# Patient Record
Sex: FEMALE | Race: WHITE | NOT HISPANIC OR LATINO | Employment: OTHER | URBAN - METROPOLITAN AREA
[De-identification: names, ages, dates, MRNs, and addresses within clinical notes are randomized per-mention and may not be internally consistent; named-entity substitution may affect disease eponyms.]

---

## 2019-08-28 ENCOUNTER — ANESTHESIA EVENT (OUTPATIENT)
Dept: SURGERY | Facility: AMBULARY SURGERY CENTER | Age: 74
End: 2019-08-28
Payer: MEDICARE

## 2019-08-28 RX ORDER — PRAVASTATIN SODIUM 20 MG/1
20 TABLET ORAL DAILY
COMMUNITY
End: 2024-01-18

## 2019-08-28 RX ORDER — METFORMIN HYDROCHLORIDE 500 MG/1
500 TABLET ORAL 2 TIMES DAILY WITH MEALS
COMMUNITY
End: 2024-01-18

## 2019-08-28 RX ORDER — LISINOPRIL AND HYDROCHLOROTHIAZIDE 10; 12.5 MG/1; MG/1
1 TABLET ORAL DAILY
COMMUNITY
End: 2024-03-25

## 2019-08-28 NOTE — DISCHARGE INSTRUCTIONS
After Cataract Surgery    You may remove your shield on the day of surgery. You may see double and your vision may be blurry when the patch is first removed. If you are uncomfortable with these symptoms, you may leave  the shield on    Tape the eye sheild over your eye at bedtime and naptime for 7 days. Tape is provided in your kit.    Do not vigorously press or rub your eye after surgery for 6 weeks.    Clean around your eyelids regularly being careful not to press on the upper lid.    You may shower, bathe and shampoo but avoid getting water, soap or shampoo in your eye for 2 weeks.  No swimming for 2 weeks.    Resume normal activities tomorrow, but no lifting or bending for 1 week.  Rest today. Reading, writing or watching TV are fine.     No eye makeup for 1 week, no eye creams for 24 hrs. Caution using perfumes or colognes.    You may be sensitive to light and  may wear sunglasses provided when outdoors     Your glasses may not be the right strength for your eye after surgery.  You may wear your old glasses or go without.  You may find [employer] reading glasses helpful.    Symptoms you may experience the first day:  Blurry vision, Double Vision, Redish color to objects, Sensitivity to light, flickering light, Burning or scratchy feeling in eye.    Serious Problems that could arise that need immediate reporting to the Dr:     A sudden Decrease or loss of vision in either eye  Your vision is becoming worse instead of better each day  You develop a shadow in your vision  You see flashing lights  You have a continuous ache or increased pain not relieved by over the counter pain medication.    If you have any questions, please re read this carefully. If your  Question is still not answered call the office. [phone]. We are here to help you    Using eye drops:  Wash hands, shake drops well, tilt head back, open eye and look up, gently pull lower eyelid down to form a pocket, place drop into the pocket formed,  close eye    Be careful not to touch your eyelashes or finger to the tip of the bottle. Allow 5 m between each drop.    Prednisolone 1 drop 4x daily  Polymyxin 1 drop 4x daily  Ketorolac 1 drop 4 x daily    Abida ph?u thu?t ??c th?y leslie th?    B?n có th? lo?i b? lá ch?n c?a b?n vào ngày ph?u thu?t. B?n có th? th?y g?p ?ôi và t?m nhìn c?a b?n có th? b? m? khi mi?ng vá ???c g? b? l?n ??u tiên. N?u b?n không tho?i mái v?i nh?ng tri?u ch?ng này, b?n có th? ?? t?m khiên davidsonn    Dán m?t lên m?t khi ?i ng? và ng? tr?a itz 7 ngày. B?ng ???c cung c?p itz b? c?a b?n.    Không ?n m?nh ho?c d?i m?t abida ph?u thu?t itz 6 tu?n.    Th??ng xuyên v? sinh xung quanh mí m?t c?a b?n c?n th?n không ?n vào n?p trên.    B?n có th? t?m, t?m và g?i ??u nh?ng tránh ?? n??c, xà phòng ho?c d?u g?i vào m?t itz 2 tu?n. Không b?i itz 2 tu?n.    Ti?p t?c các ho?t ??ng bình th??ng vào ngày anirudh, nh?ng không nâng ho?c u?n itz 1 tu?n. Ngh? ng?i hôm nay. ??c, vi?t ho?c xem TV ??u ?n.    Không marifer ?i?m m?t itz 1 tu?n, không dùng arabella m?t izt 24 gi?. Th?n tr?ng khi s? d?ng n??c darya ho?c colognes.    B?n có th? nh?y c?m v?i ánh sáng và có th? ?eo kính râm khi ? ngoài tr?i    Kính c?a b?n có th? không ph?i là s?c m?nh phù h?p v?i m?t c?a b?n abida khi ph?u thu?t. B?n có th? ?eo kính c? c?a b?n ho?c ?i mà không có. B?n có th? th?y c?a hàng durg ??c kính h?u ích.    Các tri?u ch?ng b?n có th? g?p itz ngày ??u tiên:  T?m nhìn m?, T?m nhìn ?ôi, Màu ?? cho các v?t th?, Nh?y c?m v?i ánh sáng, ánh sáng nh?p nháy, C?m giác nóng rát ho?c khó ch?u itz m?t.    Các v?n ?? nghiêm tr?ng có th? phát sinh c?n báo cáo ngay cho Ti?n s?:    Gi?m ho?c m?t th? l?c ??t ng?t ? m?t itz ed m?t  T?m nhìn c?a b?n ?ang tr? nên t?i t? thay vì t?t h?n m?i ngày  B?n phát tri?n m?t cái bóng itz t?m nhìn c?a b?n  B?n th?y ?èn nh?p nháy  B?n b? ?au liên t?c ho?c ?au t?ng lên không gi?m b?ng thu?c gi?m ?au không kê ??n.    N?u b?n có b?t k? câu h?i, dylan toddi lòng ??c  l?i ?i?u này m?t cách c?n th?n. N?u câu h?i c?a b?n v?n không ???c tr? l?i hãy g?i cho v?n phòng. . Chúng tôi ? ?ây ?? giúp b?n    S? d?ng thu?c nh? m?t:  R?a rossi, l?c nh?, nghiêng ??u, m? m?t và nhìn lên, nh? nhàng kéo mí m?t d??i govea?ng ?? t?o thành túi, ??t vào túi hình thành, nh?m m?t l?i  C?n th?n không ch?m vào lông mi ho?c ngón rossi c?a b?n vào ??u yulia. Cho phép 5 m gi?a m?i l?n th?.    Prednison 1 gi?t 4x m?i yvonne  Polymyxin 1 gi?t 4x m?i yvonne  Ketorolac 1 gi?t 4 x m?lópez russell     Send feedback  History  [facility]

## 2019-08-29 ENCOUNTER — HOSPITAL ENCOUNTER (OUTPATIENT)
Facility: AMBULARY SURGERY CENTER | Age: 74
Discharge: HOME | End: 2019-08-29
Attending: OPHTHALMOLOGY | Admitting: OPHTHALMOLOGY
Payer: MEDICARE

## 2019-08-29 ENCOUNTER — ANESTHESIA (OUTPATIENT)
Dept: SURGERY | Facility: AMBULARY SURGERY CENTER | Age: 74
End: 2019-08-29
Payer: MEDICARE

## 2019-08-29 DIAGNOSIS — H26.9 CATARACT, LEFT: ICD-10-CM

## 2019-08-29 PROCEDURE — D9220A PRA ANESTHESIA: ICD-10-PCS | Mod: ,,, | Performed by: ANESTHESIOLOGY

## 2019-08-29 PROCEDURE — 66984 XCAPSL CTRC RMVL W/O ECP: CPT | Performed by: OPHTHALMOLOGY

## 2019-08-29 PROCEDURE — D9220A PRA ANESTHESIA: Mod: ,,, | Performed by: ANESTHESIOLOGY

## 2019-08-29 DEVICE — LENS 23.0: Type: IMPLANTABLE DEVICE | Site: EYE | Status: FUNCTIONAL

## 2019-08-29 RX ORDER — CYCLOPENTOLATE HYDROCHLORIDE 10 MG/ML
1 SOLUTION/ DROPS OPHTHALMIC
Status: COMPLETED | OUTPATIENT
Start: 2019-08-29 | End: 2019-08-29

## 2019-08-29 RX ORDER — TROPICAMIDE 10 MG/ML
1 SOLUTION/ DROPS OPHTHALMIC
Status: COMPLETED | OUTPATIENT
Start: 2019-08-29 | End: 2019-08-29

## 2019-08-29 RX ORDER — POLYMYXIN B SULFATE AND TRIMETHOPRIM 1; 10000 MG/ML; [USP'U]/ML
SOLUTION OPHTHALMIC
Status: DISCONTINUED | OUTPATIENT
Start: 2019-08-29 | End: 2019-08-29 | Stop reason: HOSPADM

## 2019-08-29 RX ORDER — LIDOCAINE HYDROCHLORIDE 10 MG/ML
1 INJECTION, SOLUTION EPIDURAL; INFILTRATION; INTRACAUDAL; PERINEURAL ONCE
Status: COMPLETED | OUTPATIENT
Start: 2019-08-29 | End: 2019-08-29

## 2019-08-29 RX ORDER — ACETAMINOPHEN 325 MG/1
650 TABLET ORAL ONCE
Status: COMPLETED | OUTPATIENT
Start: 2019-08-29 | End: 2019-08-29

## 2019-08-29 RX ORDER — TROPICAMIDE 10 MG/ML
1 SOLUTION/ DROPS OPHTHALMIC
Status: DISCONTINUED | OUTPATIENT
Start: 2019-08-29 | End: 2023-04-24

## 2019-08-29 RX ORDER — CYCLOPENTOLATE HYDROCHLORIDE 10 MG/ML
1 SOLUTION/ DROPS OPHTHALMIC
Status: DISCONTINUED | OUTPATIENT
Start: 2019-08-29 | End: 2023-04-24

## 2019-08-29 RX ORDER — EPINEPHRINE CONVENIENCE KIT 1 MG/ML(1)
KIT INTRAMUSCULAR; SUBCUTANEOUS
Status: DISCONTINUED | OUTPATIENT
Start: 2019-08-29 | End: 2019-08-29 | Stop reason: HOSPADM

## 2019-08-29 RX ORDER — LIDOCAINE HYDROCHLORIDE 40 MG/ML
1 INJECTION, SOLUTION RETROBULBAR
Status: COMPLETED | OUTPATIENT
Start: 2019-08-29 | End: 2019-08-29

## 2019-08-29 RX ORDER — MIDAZOLAM HYDROCHLORIDE 1 MG/ML
INJECTION, SOLUTION INTRAMUSCULAR; INTRAVENOUS
Status: DISCONTINUED | OUTPATIENT
Start: 2019-08-29 | End: 2019-08-29

## 2019-08-29 RX ORDER — ACETAMINOPHEN 325 MG/1
TABLET ORAL
Status: COMPLETED
Start: 2019-08-29 | End: 2019-08-29

## 2019-08-29 RX ORDER — PHENYLEPHRINE HYDROCHLORIDE 100 MG/ML
1 SOLUTION/ DROPS OPHTHALMIC
Status: DISCONTINUED | OUTPATIENT
Start: 2019-08-29 | End: 2023-04-24

## 2019-08-29 RX ORDER — SODIUM CHLORIDE, SODIUM LACTATE, POTASSIUM CHLORIDE, CALCIUM CHLORIDE 600; 310; 30; 20 MG/100ML; MG/100ML; MG/100ML; MG/100ML
INJECTION, SOLUTION INTRAVENOUS CONTINUOUS
Status: DISCONTINUED | OUTPATIENT
Start: 2019-08-29 | End: 2019-08-29 | Stop reason: HOSPADM

## 2019-08-29 RX ORDER — PROPARACAINE HYDROCHLORIDE 5 MG/ML
1 SOLUTION/ DROPS OPHTHALMIC
Status: DISCONTINUED | OUTPATIENT
Start: 2019-08-29 | End: 2023-04-24

## 2019-08-29 RX ORDER — LIDOCAINE HYDROCHLORIDE 40 MG/ML
INJECTION, SOLUTION RETROBULBAR
Status: DISCONTINUED | OUTPATIENT
Start: 2019-08-29 | End: 2019-08-29 | Stop reason: HOSPADM

## 2019-08-29 RX ORDER — PHENYLEPHRINE HYDROCHLORIDE 25 MG/ML
1 SOLUTION/ DROPS OPHTHALMIC
Status: COMPLETED | OUTPATIENT
Start: 2019-08-29 | End: 2019-08-29

## 2019-08-29 RX ORDER — SODIUM CHLORIDE 0.9 % (FLUSH) 0.9 %
3 SYRINGE (ML) INJECTION
Status: DISCONTINUED | OUTPATIENT
Start: 2019-08-29 | End: 2019-08-29 | Stop reason: HOSPADM

## 2019-08-29 RX ORDER — PROPARACAINE HYDROCHLORIDE 5 MG/ML
SOLUTION/ DROPS OPHTHALMIC
Status: DISCONTINUED | OUTPATIENT
Start: 2019-08-29 | End: 2019-08-29 | Stop reason: HOSPADM

## 2019-08-29 RX ORDER — PHENYLEPHRINE HYDROCHLORIDE 25 MG/ML
SOLUTION/ DROPS OPHTHALMIC
Status: DISCONTINUED
Start: 2019-08-29 | End: 2019-08-29 | Stop reason: HOSPADM

## 2019-08-29 RX ORDER — ONDANSETRON 2 MG/ML
INJECTION INTRAMUSCULAR; INTRAVENOUS
Status: DISCONTINUED | OUTPATIENT
Start: 2019-08-29 | End: 2019-08-29

## 2019-08-29 RX ORDER — ONDANSETRON 2 MG/ML
4 INJECTION INTRAMUSCULAR; INTRAVENOUS ONCE AS NEEDED
Status: DISCONTINUED | OUTPATIENT
Start: 2019-08-29 | End: 2019-08-29 | Stop reason: HOSPADM

## 2019-08-29 RX ADMIN — TROPICAMIDE 1 DROP: 10 SOLUTION/ DROPS OPHTHALMIC at 08:08

## 2019-08-29 RX ADMIN — LIDOCAINE HYDROCHLORIDE 0.2 ML: 10 INJECTION, SOLUTION EPIDURAL; INFILTRATION; INTRACAUDAL; PERINEURAL at 08:08

## 2019-08-29 RX ADMIN — CYCLOPENTOLATE HYDROCHLORIDE 1 DROP: 10 SOLUTION/ DROPS OPHTHALMIC at 08:08

## 2019-08-29 RX ADMIN — LIDOCAINE HYDROCHLORIDE 1 DROP: 40 INJECTION, SOLUTION RETROBULBAR at 09:08

## 2019-08-29 RX ADMIN — ACETAMINOPHEN 650 MG: 325 TABLET ORAL at 10:08

## 2019-08-29 RX ADMIN — CYCLOPENTOLATE HYDROCHLORIDE 1 DROP: 10 SOLUTION/ DROPS OPHTHALMIC at 09:08

## 2019-08-29 RX ADMIN — PHENYLEPHRINE HYDROCHLORIDE 1 DROP: 25 SOLUTION/ DROPS OPHTHALMIC at 08:08

## 2019-08-29 RX ADMIN — ONDANSETRON 4 MG: 2 INJECTION INTRAMUSCULAR; INTRAVENOUS at 09:08

## 2019-08-29 RX ADMIN — MIDAZOLAM HYDROCHLORIDE 1 MG: 1 INJECTION, SOLUTION INTRAMUSCULAR; INTRAVENOUS at 09:08

## 2019-08-29 RX ADMIN — PROPARACAINE HYDROCHLORIDE 1 DROP: 5 SOLUTION/ DROPS OPHTHALMIC at 08:08

## 2019-08-29 RX ADMIN — SODIUM CHLORIDE, SODIUM LACTATE, POTASSIUM CHLORIDE, CALCIUM CHLORIDE: 600; 310; 30; 20 INJECTION, SOLUTION INTRAVENOUS at 08:08

## 2019-08-29 NOTE — OP NOTE
SURGEON: Meghan Bennett MD    PRE-OPERATIVE DIAGNOSIS: Visually significant cataract, left eye    POST-OPERATIVE DIAGNOSIS: Visually significant cataract, left eye    DATE OF SURGERY: 8/29/2019    PROCEDURES: Phacoemulsification with intraocular lens, left eye    IMPLANT: [id number] (Model #), +23.0 Diopters    ANESTHESIA: MAC with topical lidocaine    COMPLICATIONS: None    ESTIMATED BLOOD LOSS: None    INDICATIONS:  The patient has a history of painless progressive visual loss and difficulty with activities of daily living secondary to cataract formation. After a thorough discussion of the risks, benefits and alternatives to cataract surgery, including, but not limited to, the rare risks of infection, retinal detachment, need for additional surgery, loss of vision and even loss of the eye, the patient voices good understanding and desires to proceed.    DESCRIPTION OF PROCEDURE:  The patients IOL calculations and lens selection were reviewed and confirmed. After verification and marking of the proper eye in the preop holding area, several sets of 1% Mydriacil, 2.5% phenylephrine, and 1% Cyclogyl drops were then placed. The patient was brought to the operating room in supine position where the eye was prepped and draped in standard sterile fashion using 5% betadine and a lid speculum placed. Topical 4% lidocaine was used in addition to the preoperative anesthesia. A paracentesis incision was created through which viscoelastic was used to fill the anterior chamber. A 2.4mm keratome blade was used to create a triplanar temporal clear corneal incision. A cystotome and Utrata forceps was then used to fashion a continuous curvilinear capsulorrhexis. Hydrodissection and hydrodelineation with BSS was performed using a hydrodissection cannula. Phacoemulsification of the nucleus was carried out with a divide and conquer technique, and all remaining epinuclear and cortical material was removed. The eye was reformed using  viscoelastic and the intraocular lens was implanted into the capsular bag. All remaining viscoelastic was removed from the eye. At the end of the case, the lens was well-centered and all wounds were found to be watertight. Drops of polytrim and pred forte were instilled and an eye shield placed over the eye. The patient tolerated the procedure well and was taken to the recovery area in stable condition. The patient was instructed to follow-up for routine post-operative care the following morning.

## 2019-08-29 NOTE — ANESTHESIA POSTPROCEDURE EVALUATION
Anesthesia Post Evaluation    Patient: Gina Bennett    Procedure(s) Performed: Procedure(s) (LRB):  EXTRACTION, CATARACT (Left)    Final Anesthesia Type: MAC  Patient location during evaluation: PACU  Patient participation: Yes- Able to Participate  Level of consciousness: awake and alert and oriented  Post-procedure vital signs: reviewed and stable  Pain management: adequate  Airway patency: patent  PONV status at discharge: No PONV  Anesthetic complications: no      Cardiovascular status: blood pressure returned to baseline  Respiratory status: unassisted, spontaneous ventilation and room air  Hydration status: euvolemic  Follow-up not needed.          Vitals Value Taken Time   /64 8/29/2019  8:30 AM   Temp 36.6 °C (97.9 °F) 8/29/2019  8:30 AM   Pulse 56 8/29/2019  8:30 AM   Resp 18 8/29/2019  8:30 AM   SpO2 97 % 8/29/2019  8:30 AM         No case tracking events are documented in the log.      Pain/Scotty Score: No data recorded

## 2019-08-29 NOTE — ANESTHESIA PREPROCEDURE EVALUATION
08/29/2019  Gina Bennett is a 74 y.o., female.    Anesthesia Evaluation    I have reviewed the Patient Summary Reports.    I have reviewed the Nursing Notes.   I have reviewed the Medications.     Review of Systems  Anesthesia Hx:  No problems with previous Anesthesia Denies Hx of Anesthetic complications    Social:  Non-Smoker    Cardiovascular:   Hypertension Denies MI.  Denies CAD.    Denies CABG/stent.   Denies Angina.    Pulmonary:   Denies COPD.  Denies Asthma.  Denies Recent URI.    Renal/:   Denies Chronic Renal Disease.     Hepatic/GI:   Denies GERD. Denies Liver Disease.    Neurological:   Denies TIA. Denies CVA. Denies Seizures.    Endocrine:   Denies Diabetes. Denies Hypothyroidism.    Psych:   Denies Psychiatric History.          Physical Exam  General:  Well nourished    Airway/Jaw/Neck:  Airway Findings: Mouth Opening: Normal Tongue: Normal  General Airway Assessment: Adult, Good  Mallampati: II  Improves to II with phonation.  TM Distance: 4-6 cm      Dental:  Dental Findings: In tact   Chest/Lungs:  Chest/Lungs Findings: Clear to auscultation, Normal Respiratory Rate     Heart/Vascular:  Heart Findings: Rate: Normal  Rhythm: Regular Rhythm  Sounds: Normal  Heart murmur: negative       Mental Status:  Mental Status Findings:  Cooperative, Alert and Oriented         Anesthesia Plan  Type of Anesthesia, risks & benefits discussed:  Anesthesia Type:  MAC  Patient's Preference:   Intra-op Monitoring Plan: standard ASA monitors  Intra-op Monitoring Plan Comments:   Post Op Pain Control Plan:   Post Op Pain Control Plan Comments:   Induction:   IV  Beta Blocker:  Patient is not currently on a Beta-Blocker (No further documentation required).       Informed Consent: Patient understands risks and agrees with Anesthesia plan.  Questions answered. Anesthesia consent signed with patient.  ASA Score:  3     Day of Surgery Review of History & Physical: I have interviewed and examined the patient. I have reviewed the patient's H&P dated:  There are no significant changes.  H&P update referred to the surgeon.         Ready For Surgery From Anesthesia Perspective.

## 2019-08-29 NOTE — DISCHARGE SUMMARY
Discharge Summary  Ophthalmology Service      Admit Date: 8/29/2019     Discharge Date: 8/29/2019     Attending Physician: Meghan Bennett MD     Discharged Condition: Good    Reason for Admission: Combined forms of age-related cataract of left eye [H25.812]  Cataract, left [H26.9]     Treatments/Procedures: Cataract Surgery (see op report for details).    Hospital Course: Stable    Consults: None    Significant Diagnostic Studies: None    Disposition: Home    Patient Instructions:   - Resume same diet as prior to surgery  - Resume activity as tolerated with no swimming for 1 week  - Keep eye clean and dry with eye shield on until follow up.  - Follow up in 1 day as previously scheduled for routine postoperative care    Patient Instructions:   Current Discharge Medication List      CONTINUE these medications which have NOT CHANGED    Details   lisinopril-hydrochlorothiazide (PRINZIDE,ZESTORETIC) 10-12.5 mg per tablet Take 1 tablet by mouth once daily.      metFORMIN (GLUCOPHAGE) 500 MG tablet Take 500 mg by mouth 2 (two) times daily with meals.      pravastatin (PRAVACHOL) 20 MG tablet Take 20 mg by mouth once daily.             Discharge Procedure Orders   Lifting restrictions     Notify your health care provider if you experience any of the following:  severe uncontrolled pain     Leave dressing on - Keep it clean, dry, and intact until clinic visit     Activity as tolerated

## 2019-08-29 NOTE — TRANSFER OF CARE
"Anesthesia Transfer of Care Note    Patient: Gina Bennett    Procedure(s) Performed: Procedure(s) (LRB):  EXTRACTION, CATARACT (Left)    Patient location: PACU    Anesthesia Type: MAC    Transport from OR: Transported from OR on room air with adequate spontaneous ventilation    Post pain: adequate analgesia    Post assessment: no apparent anesthetic complications and tolerated procedure well    Post vital signs: stable    Level of consciousness: awake    Nausea/Vomiting: no nausea/vomiting    Complications: none    Transfer of care protocol was followed      Last vitals:   Visit Vitals  BP (!) 144/64   Pulse (!) 56   Temp 36.6 °C (97.9 °F)   Resp 18   Ht 4' 11.84" (1.52 m)   Wt 56.7 kg (125 lb)   SpO2 97%   Breastfeeding? No   BMI 24.54 kg/m²     "

## 2019-08-29 NOTE — BRIEF OP NOTE
Brief Operative Note  Ophthalmology Service      Date of Procedure: 8/29/2019     Attending Physician: Meghan Bennett MD       Pre-Operative Diagnosis: Combined forms of age-related cataract of left eye [H25.812]  Cataract, left [H26.9]     Post-Operative Diagnosis: Same as pre-operative diagnosis    Treatments/Procedures: Cataract extraction w/ IOL left eye    Intraoperative Findings: Cataract left eye    Anesthesia: MAC with local lidocaine    Complications: None    Estimated Blood Loss: minimal    Specimens: None    -------------------------------------------------------------  Full Operative Report to follow.  -------------------------------------------------------------

## 2019-08-29 NOTE — PLAN OF CARE
Stable, states thru  son that ready to go home, letty po fluids, pain minimal, ambulated to car with RN to son

## 2019-08-30 VITALS
OXYGEN SATURATION: 100 % | TEMPERATURE: 98 F | RESPIRATION RATE: 20 BRPM | HEART RATE: 53 BPM | SYSTOLIC BLOOD PRESSURE: 149 MMHG | DIASTOLIC BLOOD PRESSURE: 69 MMHG | HEIGHT: 60 IN | BODY MASS INDEX: 24.54 KG/M2 | WEIGHT: 125 LBS

## 2019-08-30 LAB — POCT GLUCOSE: 121 MG/DL (ref 70–110)

## 2021-11-23 ENCOUNTER — PATIENT OUTREACH (OUTPATIENT)
Dept: ADMINISTRATIVE | Facility: HOSPITAL | Age: 76
End: 2021-11-23
Payer: MEDICARE

## 2023-04-24 ENCOUNTER — LAB VISIT (OUTPATIENT)
Dept: PRIMARY CARE CLINIC | Facility: CLINIC | Age: 78
End: 2023-04-24
Payer: MEDICARE

## 2023-04-24 ENCOUNTER — OFFICE VISIT (OUTPATIENT)
Dept: PRIMARY CARE CLINIC | Facility: CLINIC | Age: 78
End: 2023-04-24
Payer: MEDICARE

## 2023-04-24 VITALS
HEART RATE: 67 BPM | SYSTOLIC BLOOD PRESSURE: 125 MMHG | DIASTOLIC BLOOD PRESSURE: 58 MMHG | HEIGHT: 60 IN | BODY MASS INDEX: 23.74 KG/M2 | WEIGHT: 120.94 LBS | OXYGEN SATURATION: 99 %

## 2023-04-24 DIAGNOSIS — M25.50 ARTHRALGIA, UNSPECIFIED JOINT: ICD-10-CM

## 2023-04-24 DIAGNOSIS — Z00.00 ANNUAL PHYSICAL EXAM: ICD-10-CM

## 2023-04-24 DIAGNOSIS — E78.5 HYPERLIPIDEMIA, UNSPECIFIED HYPERLIPIDEMIA TYPE: ICD-10-CM

## 2023-04-24 DIAGNOSIS — R79.9 ABNORMAL FINDING OF BLOOD CHEMISTRY, UNSPECIFIED: ICD-10-CM

## 2023-04-24 DIAGNOSIS — R25.2 MUSCLE CRAMPS: ICD-10-CM

## 2023-04-24 DIAGNOSIS — E11.9 TYPE 2 DIABETES MELLITUS WITHOUT COMPLICATION, WITHOUT LONG-TERM CURRENT USE OF INSULIN: ICD-10-CM

## 2023-04-24 DIAGNOSIS — I10 ESSENTIAL HYPERTENSION: ICD-10-CM

## 2023-04-24 DIAGNOSIS — R25.2 MUSCLE CRAMPS: Primary | ICD-10-CM

## 2023-04-24 LAB
ALBUMIN SERPL BCP-MCNC: 4.5 G/DL (ref 3.5–5.2)
ALP SERPL-CCNC: 74 U/L (ref 55–135)
ALT SERPL W/O P-5'-P-CCNC: 31 U/L (ref 10–44)
ANION GAP SERPL CALC-SCNC: 10 MMOL/L (ref 8–16)
AST SERPL-CCNC: 27 U/L (ref 10–40)
BASOPHILS # BLD AUTO: 0.06 K/UL (ref 0–0.2)
BASOPHILS NFR BLD: 0.8 % (ref 0–1.9)
BILIRUB SERPL-MCNC: 0.6 MG/DL (ref 0.1–1)
BUN SERPL-MCNC: 20 MG/DL (ref 8–23)
CALCIUM SERPL-MCNC: 10.5 MG/DL (ref 8.7–10.5)
CCP AB SER IA-ACNC: 1 U/ML
CHLORIDE SERPL-SCNC: 100 MMOL/L (ref 95–110)
CHOLEST SERPL-MCNC: 143 MG/DL (ref 120–199)
CHOLEST/HDLC SERPL: 2.6 {RATIO} (ref 2–5)
CO2 SERPL-SCNC: 26 MMOL/L (ref 23–29)
CREAT SERPL-MCNC: 1 MG/DL (ref 0.5–1.4)
CRP SERPL-MCNC: 0.4 MG/L (ref 0–8.2)
DIFFERENTIAL METHOD: ABNORMAL
EOSINOPHIL # BLD AUTO: 0.5 K/UL (ref 0–0.5)
EOSINOPHIL NFR BLD: 6 % (ref 0–8)
ERYTHROCYTE [DISTWIDTH] IN BLOOD BY AUTOMATED COUNT: 13.4 % (ref 11.5–14.5)
EST. GFR  (NO RACE VARIABLE): 58 ML/MIN/1.73 M^2
ESTIMATED AVG GLUCOSE: 137 MG/DL (ref 68–131)
GLUCOSE SERPL-MCNC: 125 MG/DL (ref 70–110)
HBA1C MFR BLD: 6.4 % (ref 4–5.6)
HCT VFR BLD AUTO: 35.8 % (ref 37–48.5)
HDLC SERPL-MCNC: 54 MG/DL (ref 40–75)
HDLC SERPL: 37.8 % (ref 20–50)
HGB BLD-MCNC: 11.9 G/DL (ref 12–16)
IMM GRANULOCYTES # BLD AUTO: 0.02 K/UL (ref 0–0.04)
IMM GRANULOCYTES NFR BLD AUTO: 0.3 % (ref 0–0.5)
LDLC SERPL CALC-MCNC: 52.6 MG/DL (ref 63–159)
LYMPHOCYTES # BLD AUTO: 2.5 K/UL (ref 1–4.8)
LYMPHOCYTES NFR BLD: 32.2 % (ref 18–48)
MAGNESIUM SERPL-MCNC: 1.5 MG/DL (ref 1.6–2.6)
MCH RBC QN AUTO: 31.6 PG (ref 27–31)
MCHC RBC AUTO-ENTMCNC: 33.2 G/DL (ref 32–36)
MCV RBC AUTO: 95 FL (ref 82–98)
MONOCYTES # BLD AUTO: 0.5 K/UL (ref 0.3–1)
MONOCYTES NFR BLD: 6.6 % (ref 4–15)
NEUTROPHILS # BLD AUTO: 4.2 K/UL (ref 1.8–7.7)
NEUTROPHILS NFR BLD: 54.1 % (ref 38–73)
NONHDLC SERPL-MCNC: 89 MG/DL
NRBC BLD-RTO: 0 /100 WBC
PLATELET # BLD AUTO: 270 K/UL (ref 150–450)
PMV BLD AUTO: 9.4 FL (ref 9.2–12.9)
POTASSIUM SERPL-SCNC: 4.2 MMOL/L (ref 3.5–5.1)
PROT SERPL-MCNC: 8.2 G/DL (ref 6–8.4)
RBC # BLD AUTO: 3.77 M/UL (ref 4–5.4)
RHEUMATOID FACT SERPL-ACNC: <13 IU/ML (ref 0–15)
SODIUM SERPL-SCNC: 136 MMOL/L (ref 136–145)
TRIGL SERPL-MCNC: 182 MG/DL (ref 30–150)
WBC # BLD AUTO: 7.82 K/UL (ref 3.9–12.7)

## 2023-04-24 PROCEDURE — 85025 COMPLETE CBC W/AUTO DIFF WBC: CPT | Performed by: FAMILY MEDICINE

## 2023-04-24 PROCEDURE — 1101F PT FALLS ASSESS-DOCD LE1/YR: CPT | Mod: CPTII,S$GLB,, | Performed by: FAMILY MEDICINE

## 2023-04-24 PROCEDURE — 1159F MED LIST DOCD IN RCRD: CPT | Mod: CPTII,S$GLB,, | Performed by: FAMILY MEDICINE

## 2023-04-24 PROCEDURE — 1126F PR PAIN SEVERITY QUANTIFIED, NO PAIN PRESENT: ICD-10-PCS | Mod: CPTII,S$GLB,, | Performed by: FAMILY MEDICINE

## 2023-04-24 PROCEDURE — 99387 INIT PM E/M NEW PAT 65+ YRS: CPT | Mod: S$GLB,,, | Performed by: FAMILY MEDICINE

## 2023-04-24 PROCEDURE — 99387 PR PREVENTIVE VISIT,NEW,65 & OVER: ICD-10-PCS | Mod: S$GLB,,, | Performed by: FAMILY MEDICINE

## 2023-04-24 PROCEDURE — 3288F PR FALLS RISK ASSESSMENT DOCUMENTED: ICD-10-PCS | Mod: CPTII,S$GLB,, | Performed by: FAMILY MEDICINE

## 2023-04-24 PROCEDURE — 3074F PR MOST RECENT SYSTOLIC BLOOD PRESSURE < 130 MM HG: ICD-10-PCS | Mod: CPTII,S$GLB,, | Performed by: FAMILY MEDICINE

## 2023-04-24 PROCEDURE — 3078F DIAST BP <80 MM HG: CPT | Mod: CPTII,S$GLB,, | Performed by: FAMILY MEDICINE

## 2023-04-24 PROCEDURE — 1159F PR MEDICATION LIST DOCUMENTED IN MEDICAL RECORD: ICD-10-PCS | Mod: CPTII,S$GLB,, | Performed by: FAMILY MEDICINE

## 2023-04-24 PROCEDURE — 86431 RHEUMATOID FACTOR QUANT: CPT | Performed by: FAMILY MEDICINE

## 2023-04-24 PROCEDURE — 83036 HEMOGLOBIN GLYCOSYLATED A1C: CPT | Performed by: FAMILY MEDICINE

## 2023-04-24 PROCEDURE — 86140 C-REACTIVE PROTEIN: CPT | Performed by: FAMILY MEDICINE

## 2023-04-24 PROCEDURE — 83735 ASSAY OF MAGNESIUM: CPT | Performed by: FAMILY MEDICINE

## 2023-04-24 PROCEDURE — 80061 LIPID PANEL: CPT | Performed by: FAMILY MEDICINE

## 2023-04-24 PROCEDURE — 86200 CCP ANTIBODY: CPT | Performed by: FAMILY MEDICINE

## 2023-04-24 PROCEDURE — 99999 PR PBB SHADOW E&M-EST. PATIENT-LVL III: CPT | Mod: PBBFAC,,, | Performed by: FAMILY MEDICINE

## 2023-04-24 PROCEDURE — 99999 PR PBB SHADOW E&M-EST. PATIENT-LVL III: ICD-10-PCS | Mod: PBBFAC,,, | Performed by: FAMILY MEDICINE

## 2023-04-24 PROCEDURE — 3078F PR MOST RECENT DIASTOLIC BLOOD PRESSURE < 80 MM HG: ICD-10-PCS | Mod: CPTII,S$GLB,, | Performed by: FAMILY MEDICINE

## 2023-04-24 PROCEDURE — 3288F FALL RISK ASSESSMENT DOCD: CPT | Mod: CPTII,S$GLB,, | Performed by: FAMILY MEDICINE

## 2023-04-24 PROCEDURE — 80053 COMPREHEN METABOLIC PANEL: CPT | Performed by: FAMILY MEDICINE

## 2023-04-24 PROCEDURE — 1101F PR PT FALLS ASSESS DOC 0-1 FALLS W/OUT INJ PAST YR: ICD-10-PCS | Mod: CPTII,S$GLB,, | Performed by: FAMILY MEDICINE

## 2023-04-24 PROCEDURE — 3074F SYST BP LT 130 MM HG: CPT | Mod: CPTII,S$GLB,, | Performed by: FAMILY MEDICINE

## 2023-04-24 PROCEDURE — 1126F AMNT PAIN NOTED NONE PRSNT: CPT | Mod: CPTII,S$GLB,, | Performed by: FAMILY MEDICINE

## 2023-04-24 RX ORDER — CYCLOBENZAPRINE HCL 5 MG
5 TABLET ORAL 3 TIMES DAILY PRN
Qty: 30 TABLET | Refills: 1 | Status: SHIPPED | OUTPATIENT
Start: 2023-04-24 | End: 2023-04-24

## 2023-04-24 RX ORDER — CYCLOBENZAPRINE HCL 5 MG
5 TABLET ORAL 3 TIMES DAILY PRN
Qty: 30 TABLET | Refills: 1 | Status: SHIPPED | OUTPATIENT
Start: 2023-04-24 | End: 2023-07-24

## 2023-04-24 NOTE — PROGRESS NOTES
Clinic Note  4/24/2023      Subjective:       Patient ID:  Gina is a 78 y.o. female being seen for an new visit.      Chief Complaint: Establish Care    Establish care-patient with a history of hypertension, type 2 diabetes, leg cramps, left cataracts here to establish care.  Lives in [facility] with son    Joint pain and muscle cramps-since having COVID-19 in April 2022, patient has been having daily muscle cramps, worse of the legs.  Some days are better than others.  Had a significant leg cramps of her left leg about 1 month ago where she cannot move her left leg for about an hour.  Has been on pravastatin, lisinopril, HCTZ for years without issues prior to this.  Does drink tea throughout the day, does not drink that much water.        No family history on file.  Social History     Socioeconomic History    Marital status:    Tobacco Use    Smoking status: Never    Smokeless tobacco: Never   Substance and Sexual Activity    Alcohol use: Never     Past Surgical History:   Procedure Laterality Date    c sectiopn      EXTRACTION OF CATARACT Left 8/29/2019    Procedure: EXTRACTION, CATARACT;  Surgeon: Meghan Bennett MD;  Location: [facility];  Service: Ophthalmology;  Laterality: Left;     Medication List with Changes/Refills   New Medications    CYCLOBENZAPRINE (FLEXERIL) 5 MG TABLET    Take 1 tablet (5 mg total) by mouth 3 (three) times daily as needed for Muscle spasms (chuot rut).   Current Medications    LISINOPRIL-HYDROCHLOROTHIAZIDE (PRINZIDE,ZESTORETIC) 10-12.5 MG PER TABLET    Take 1 tablet by mouth once daily.    METFORMIN (GLUCOPHAGE) 500 MG TABLET    Take 500 mg by mouth 2 (two) times daily with meals.    PRAVASTATIN (PRAVACHOL) 20 MG TABLET    Take 20 mg by mouth once daily.     Patient Active Problem List   Diagnosis    Cataract, left     Review of Systems   Constitutional:  Negative for chills, fever, malaise/fatigue and weight loss.   HENT:  Negative for congestion, sinus pain and sore  "throat.    Respiratory:  Negative for cough, shortness of breath and wheezing.    Cardiovascular:  Negative for chest pain and palpitations.   Gastrointestinal:  Negative for constipation, diarrhea, nausea and vomiting.   Genitourinary:  Negative for dysuria, frequency and urgency.   Musculoskeletal:  Positive for joint pain and myalgias. Negative for back pain, falls and neck pain.   Skin:  Negative for rash.   Neurological:  Negative for headaches.       Objective:      BP (!) 125/58 (BP Location: Left arm, Patient Position: Sitting, BP Method: X-Large (Automatic))   Pulse 67   Ht 4' 11.84" (1.52 m)   Wt 54.9 kg (120 lb 14.8 oz)   SpO2 99%   BMI 23.74 kg/m²   Estimated body mass index is 23.74 kg/m² as calculated from the following:    Height as of this encounter: 4' 11.84" (1.52 m).    Weight as of this encounter: 54.9 kg (120 lb 14.8 oz).  Physical Exam  Vitals reviewed.   Constitutional:       General: She is not in acute distress.     Appearance: She is not diaphoretic.   HENT:      Head: Normocephalic and atraumatic.   Eyes:      Conjunctiva/sclera: Conjunctivae normal.   Cardiovascular:      Rate and Rhythm: Normal rate and regular rhythm.      Heart sounds: Normal heart sounds.   Pulmonary:      Effort: Pulmonary effort is normal. No respiratory distress.      Breath sounds: Normal breath sounds. No wheezing.   Abdominal:      General: Bowel sounds are normal.      Palpations: Abdomen is soft.   Musculoskeletal:         General: Normal range of motion.      Cervical back: Normal range of motion.   Skin:     General: Skin is warm and dry.      Findings: No erythema or rash.   Neurological:      Mental Status: She is alert and oriented to person, place, and time.   Psychiatric:         Mood and Affect: Mood and affect normal.         Behavior: Behavior normal.         Thought Content: Thought content normal.         Judgment: Judgment normal.         Assessment and Plan:     1. Muscle cramps  - patient " with muscle cramps and joint pain post COVID, will screen for electrolyte abnormalities in the setting of being on lisinopril and HCTZ, discussed doing a trial of being off the statin, checking for CRP/RF, CCP in the setting of joint pain, will do a trial of Flexeril p.r.n., discussed with patient to increase p.o. liquid intake  - Magnesium; Future  - cyclobenzaprine (FLEXERIL) 5 MG tablet; Take 1 tablet (5 mg total) by mouth 3 (three) times daily as needed for Muscle spasms (chuot rut).  Dispense: 30 tablet; Refill: 1    2. Arthralgia, unspecified joint  - C-reactive protein; Future  - RHEUMATOID FACTOR; Future  - CYCLIC CITRUL PEPTIDE ANTIBODY, IGG; Future    3. Type 2 diabetes mellitus without complication, without long-term current use of insulin  - on metformin  - Hemoglobin A1C; Future    4. Hyperlipidemia, unspecified hyperlipidemia type  - Lipid Panel; Future    5. Essential hypertension  - blood pressure controlled today on HCTZ and lisinopril  - Comprehensive Metabolic Panel; Future    6. Annual physical exam  - CBC Auto Differential; Future    7. Abnormal finding of blood chemistry, unspecified  - CBC Auto Differential; Future        Follow up:   Follow up in about 3 months (around 7/24/2023) for follow-up.     Other Orders Placed This Visit:  Orders Placed This Encounter   Procedures    CBC Auto Differential     Standing Status:   Future     Number of Occurrences:   1     Standing Expiration Date:   4/24/2024    Comprehensive Metabolic Panel     Standing Status:   Future     Number of Occurrences:   1     Standing Expiration Date:   4/24/2024    Lipid Panel     Standing Status:   Future     Number of Occurrences:   1     Standing Expiration Date:   4/24/2024    Hemoglobin A1C     Standing Status:   Future     Number of Occurrences:   1     Standing Expiration Date:   4/24/2024    C-reactive protein     Standing Status:   Future     Number of Occurrences:   1     Standing Expiration Date:   6/22/2024     RHEUMATOID FACTOR     Standing Status:   Future     Number of Occurrences:   1     Standing Expiration Date:   6/22/2024    CYCLIC CITRUL PEPTIDE ANTIBODY, IGG     Standing Status:   Future     Number of Occurrences:   1     Standing Expiration Date:   6/22/2024    Magnesium     Standing Status:   Future     Number of Occurrences:   1     Standing Expiration Date:   6/22/2024           Spencer Bennett MD        This note is dictated on [employer] word recognition program.  There are word recognition mistakes that are occasionally missed on review.

## 2023-04-26 DIAGNOSIS — Z78.0 MENOPAUSE: ICD-10-CM

## 2023-07-24 ENCOUNTER — LAB VISIT (OUTPATIENT)
Dept: PRIMARY CARE CLINIC | Facility: CLINIC | Age: 78
End: 2023-07-24
Payer: MEDICARE

## 2023-07-24 ENCOUNTER — OFFICE VISIT (OUTPATIENT)
Dept: PRIMARY CARE CLINIC | Facility: CLINIC | Age: 78
End: 2023-07-24
Payer: MEDICARE

## 2023-07-24 VITALS
SYSTOLIC BLOOD PRESSURE: 116 MMHG | HEART RATE: 62 BPM | BODY MASS INDEX: 24.07 KG/M2 | DIASTOLIC BLOOD PRESSURE: 65 MMHG | WEIGHT: 122.56 LBS

## 2023-07-24 DIAGNOSIS — Z00.00 ANNUAL PHYSICAL EXAM: ICD-10-CM

## 2023-07-24 DIAGNOSIS — L29.9 ITCHY SKIN: Primary | ICD-10-CM

## 2023-07-24 DIAGNOSIS — E11.9 TYPE 2 DIABETES MELLITUS WITHOUT COMPLICATION, WITHOUT LONG-TERM CURRENT USE OF INSULIN: ICD-10-CM

## 2023-07-24 LAB
ESTIMATED AVG GLUCOSE: 131 MG/DL (ref 68–131)
HBA1C MFR BLD: 6.2 % (ref 4–5.6)

## 2023-07-24 PROCEDURE — 3074F SYST BP LT 130 MM HG: CPT | Mod: CPTII,S$GLB,, | Performed by: FAMILY MEDICINE

## 2023-07-24 PROCEDURE — 1159F MED LIST DOCD IN RCRD: CPT | Mod: CPTII,S$GLB,, | Performed by: FAMILY MEDICINE

## 2023-07-24 PROCEDURE — 3288F FALL RISK ASSESSMENT DOCD: CPT | Mod: CPTII,S$GLB,, | Performed by: FAMILY MEDICINE

## 2023-07-24 PROCEDURE — 3078F DIAST BP <80 MM HG: CPT | Mod: CPTII,S$GLB,, | Performed by: FAMILY MEDICINE

## 2023-07-24 PROCEDURE — 99213 PR OFFICE/OUTPT VISIT, EST, LEVL III, 20-29 MIN: ICD-10-PCS | Mod: S$GLB,,, | Performed by: FAMILY MEDICINE

## 2023-07-24 PROCEDURE — 1101F PR PT FALLS ASSESS DOC 0-1 FALLS W/OUT INJ PAST YR: ICD-10-PCS | Mod: CPTII,S$GLB,, | Performed by: FAMILY MEDICINE

## 2023-07-24 PROCEDURE — 1101F PT FALLS ASSESS-DOCD LE1/YR: CPT | Mod: CPTII,S$GLB,, | Performed by: FAMILY MEDICINE

## 2023-07-24 PROCEDURE — 99213 OFFICE O/P EST LOW 20 MIN: CPT | Mod: S$GLB,,, | Performed by: FAMILY MEDICINE

## 2023-07-24 PROCEDURE — 3078F PR MOST RECENT DIASTOLIC BLOOD PRESSURE < 80 MM HG: ICD-10-PCS | Mod: CPTII,S$GLB,, | Performed by: FAMILY MEDICINE

## 2023-07-24 PROCEDURE — 99999 PR PBB SHADOW E&M-EST. PATIENT-LVL III: ICD-10-PCS | Mod: PBBFAC,HCNC,, | Performed by: FAMILY MEDICINE

## 2023-07-24 PROCEDURE — 3288F PR FALLS RISK ASSESSMENT DOCUMENTED: ICD-10-PCS | Mod: CPTII,S$GLB,, | Performed by: FAMILY MEDICINE

## 2023-07-24 PROCEDURE — 1159F PR MEDICATION LIST DOCUMENTED IN MEDICAL RECORD: ICD-10-PCS | Mod: CPTII,S$GLB,, | Performed by: FAMILY MEDICINE

## 2023-07-24 PROCEDURE — 83036 HEMOGLOBIN GLYCOSYLATED A1C: CPT | Mod: HCNC | Performed by: FAMILY MEDICINE

## 2023-07-24 PROCEDURE — 99999 PR PBB SHADOW E&M-EST. PATIENT-LVL III: CPT | Mod: PBBFAC,HCNC,, | Performed by: FAMILY MEDICINE

## 2023-07-24 PROCEDURE — 3074F PR MOST RECENT SYSTOLIC BLOOD PRESSURE < 130 MM HG: ICD-10-PCS | Mod: CPTII,S$GLB,, | Performed by: FAMILY MEDICINE

## 2023-07-24 RX ORDER — TRIAMCINOLONE ACETONIDE 1 MG/G
CREAM TOPICAL 2 TIMES DAILY
Qty: 28.4 G | Refills: 1 | Status: SHIPPED | OUTPATIENT
Start: 2023-07-24 | End: 2023-08-16

## 2023-07-24 NOTE — PROGRESS NOTES
Clinic Note  7/24/2023      Subjective:       Patient ID:  Gina is a 78 y.o. female being seen for an established visit.    Chief Complaint: Follow-up    Follow-up-patient with a history of hypertension, type 2 diabetes, cataracts here for follow-up.      Muscle cramps-patient reports that symptoms have overall improved.  Does not drink very much water throughout the day.  Does drink tea throughout the day, however small amounts.      Pruritus-patient reports having occasional skin pruritus, requesting a cream to help with this.  Does not know if this is a reaction to certain foods.  Does not use lotion, does have dry skin.        Review of Systems   Constitutional:  Negative for chills, fever, malaise/fatigue and weight loss.   HENT:  Negative for congestion, sinus pain and sore throat.    Respiratory:  Negative for cough, shortness of breath and wheezing.    Cardiovascular:  Negative for chest pain and palpitations.   Gastrointestinal:  Negative for constipation, diarrhea, nausea and vomiting.   Genitourinary:  Negative for dysuria, frequency and urgency.   Musculoskeletal:  Negative for myalgias.   Skin:  Negative for rash.   Neurological:  Negative for headaches.     Medication List with Changes/Refills   New Medications    TRIAMCINOLONE ACETONIDE 0.1% (KENALOG) 0.1 % CREAM    Apply topically 2 (two) times daily. Thuoc ngua   Current Medications    LISINOPRIL-HYDROCHLOROTHIAZIDE (PRINZIDE,ZESTORETIC) 10-12.5 MG PER TABLET    Take 1 tablet by mouth once daily.    METFORMIN (GLUCOPHAGE) 500 MG TABLET    Take 500 mg by mouth 2 (two) times daily with meals.    PRAVASTATIN (PRAVACHOL) 20 MG TABLET    Take 20 mg by mouth once daily.   Discontinued Medications    CYCLOBENZAPRINE (FLEXERIL) 5 MG TABLET    Take 1 tablet (5 mg total) by mouth 3 (three) times daily as needed for Muscle spasms (chuot rut).       Patient Active Problem List   Diagnosis    Cataract, left           Objective:      /65 (BP Location: Left  "arm, Patient Position: Sitting, BP Method: Medium (Automatic))   Pulse 62   Wt 55.6 kg (122 lb 9.2 oz)   BMI 24.07 kg/m²   Estimated body mass index is 24.07 kg/m² as calculated from the following:    Height as of 4/24/23: 4' 11.84" (1.52 m).    Weight as of this encounter: 55.6 kg (122 lb 9.2 oz).  Physical Exam  Vitals reviewed.   Constitutional:       General: She is not in acute distress.     Appearance: She is not diaphoretic.   HENT:      Head: Normocephalic and atraumatic.   Eyes:      Conjunctiva/sclera: Conjunctivae normal.   Cardiovascular:      Rate and Rhythm: Normal rate and regular rhythm.      Heart sounds: Normal heart sounds.   Pulmonary:      Effort: Pulmonary effort is normal. No respiratory distress.      Breath sounds: Normal breath sounds. No wheezing.   Abdominal:      General: Bowel sounds are normal.      Palpations: Abdomen is soft.   Musculoskeletal:         General: Normal range of motion.      Cervical back: Normal range of motion.   Skin:     General: Skin is warm and dry.      Findings: No erythema or rash.   Neurological:      Mental Status: She is alert and oriented to person, place, and time.   Psychiatric:         Mood and Affect: Mood and affect normal.         Behavior: Behavior normal.         Thought Content: Thought content normal.         Judgment: Judgment normal.         Assessment and Plan:     1. Itchy skin  - triamcinolone acetonide 0.1% (KENALOG) 0.1 % cream; Apply topically 2 (two) times daily. Thuoc ngua  Dispense: 28.4 g; Refill: 1    2. Type 2 diabetes mellitus without complication, without long-term current use of insulin  - last hemoglobin A1c 6.4, well-controlled on metformin  - Hemoglobin A1C; Future      Follow Up:   Follow up in about 6 months (around 1/24/2024).    Other Orders Placed This Visit:  Orders Placed This Encounter   Procedures    Hemoglobin A1C         Spencer Bennett MD        This note is dictated on [employer] word recognition program.  There are " word recognition mistakes that are occasionally missed on review.

## 2023-08-13 DIAGNOSIS — L29.9 ITCHY SKIN: ICD-10-CM

## 2023-08-13 NOTE — TELEPHONE ENCOUNTER
No care due was identified.  [facility] Embedded Care Due Messages. Reference number: [id number].   8/13/2023 8:02:17 AM CDT

## 2023-08-14 NOTE — TELEPHONE ENCOUNTER
Refill Routing Note   Medication(s) are not appropriate for processing by [facility] for the following reason(s):      New or recently adjusted medication    [facility] action(s):  Defer Care Due:  None identified              Appointments  past 12m or future 3m with PCP    Date Provider   Last Visit   7/24/2023 Spencer Bennett MD   Next Visit   Visit date not found Spencer Bennett MD   ED visits in past 90 days: 0        Note composed:8:38 AM 08/14/2023

## 2023-08-16 RX ORDER — TRIAMCINOLONE ACETONIDE 1 MG/G
CREAM TOPICAL
Qty: 30 G | Refills: 1 | Status: SHIPPED | OUTPATIENT
Start: 2023-08-16 | End: 2023-09-11

## 2023-08-23 DIAGNOSIS — M81.0 OSTEOPOROSIS, UNSPECIFIED OSTEOPOROSIS TYPE, UNSPECIFIED PATHOLOGICAL FRACTURE PRESENCE: Primary | ICD-10-CM

## 2023-08-23 RX ORDER — ALENDRONATE SODIUM 70 MG/1
70 TABLET ORAL
Qty: 12 TABLET | Refills: 3 | Status: SHIPPED | OUTPATIENT
Start: 2023-08-23 | End: 2024-08-22

## 2023-08-23 RX ORDER — ERGOCALCIFEROL 1.25 MG/1
50000 CAPSULE ORAL
Qty: 12 CAPSULE | Refills: 3 | Status: SHIPPED | OUTPATIENT
Start: 2023-08-23

## 2023-08-24 ENCOUNTER — LAB VISIT (OUTPATIENT)
Dept: PRIMARY CARE CLINIC | Facility: CLINIC | Age: 78
End: 2023-08-24
Payer: MEDICARE

## 2023-08-24 DIAGNOSIS — M81.0 OSTEOPOROSIS, UNSPECIFIED OSTEOPOROSIS TYPE, UNSPECIFIED PATHOLOGICAL FRACTURE PRESENCE: ICD-10-CM

## 2023-08-24 LAB — 25(OH)D3+25(OH)D2 SERPL-MCNC: 32 NG/ML (ref 30–96)

## 2023-08-24 PROCEDURE — 82306 VITAMIN D 25 HYDROXY: CPT | Mod: HCNC | Performed by: FAMILY MEDICINE

## 2023-09-05 ENCOUNTER — TELEPHONE (OUTPATIENT)
Dept: PRIMARY CARE CLINIC | Facility: CLINIC | Age: 78
End: 2023-09-05
Payer: MEDICARE

## 2023-09-05 NOTE — TELEPHONE ENCOUNTER
----- Message from Spencer Bennett MD sent at 9/3/2023  2:12 PM CDT -----  Please call patient son and let him know that patient's vitamin-D level is on the lower end of normal.  Continue taking the vitamin-D supplements once weekly

## 2023-09-10 DIAGNOSIS — L29.9 ITCHY SKIN: ICD-10-CM

## 2023-09-10 NOTE — TELEPHONE ENCOUNTER
No care due was identified.  [facility] Embedded Care Due Messages. Reference number: [id number].   9/10/2023 8:01:15 AM CDT

## 2023-09-11 RX ORDER — TRIAMCINOLONE ACETONIDE 1 MG/G
CREAM TOPICAL
Qty: 30 G | Refills: 1 | Status: SHIPPED | OUTPATIENT
Start: 2023-09-11 | End: 2023-10-18

## 2023-09-11 NOTE — TELEPHONE ENCOUNTER
Refill Routing Note   Medication(s) are not appropriate for processing by [facility] for the following reason(s):      New or recently adjusted medication    [facility] action(s):  Defer Care Due:  None identified              Appointments  past 12m or future 3m with PCP    Date Provider   Last Visit   7/24/2023 Spencer Bennett MD   Next Visit   Visit date not found Spencer Bennett MD   ED visits in past 90 days: 0        Note composed:12:15 PM 09/11/2023

## 2023-10-15 DIAGNOSIS — L29.9 ITCHY SKIN: ICD-10-CM

## 2023-10-15 NOTE — TELEPHONE ENCOUNTER
Care Due:                  Date            Visit Type   Department     Provider  --------------------------------------------------------------------------------                                EP -                              PRIMARY      [facility] PRIMARY  Last Visit: 07-      CARE ([facility])   ARMANI BRO  Next Visit: None Scheduled  None         None Found                                                            Last  Test          Frequency    Reason                     Performed    Due Date  --------------------------------------------------------------------------------    Phosphate...  12 months..  alendronate..............  Not Found    Overdue    [employer] Embedded Care Due Messages. Reference number: [id number].   10/15/2023 8:03:15 AM CDT

## 2023-10-16 NOTE — TELEPHONE ENCOUNTER
Refill Routing Note   Medication(s) are not appropriate for processing by [facility] for the following reason(s):      New or recently adjusted medication:     [facility] action(s):  Defer Care Due:  Labs due   Medication Therapy Plan:         Appointments  past 12m or future 3m with PCP    Date Provider   Last Visit   7/24/2023 Spencer Bennett MD   Next Visit   Visit date not found Spencer Bennett MD   ED visits in past 90 days: 0        Note composed:12:45 PM 10/16/2023

## 2023-10-18 RX ORDER — TRIAMCINOLONE ACETONIDE 1 MG/G
CREAM TOPICAL
Qty: 30 G | Refills: 1 | Status: SHIPPED | OUTPATIENT
Start: 2023-10-18

## 2023-10-24 ENCOUNTER — PATIENT OUTREACH (OUTPATIENT)
Dept: PRIMARY CARE CLINIC | Facility: CLINIC | Age: 78
End: 2023-10-24
Payer: MEDICARE

## 2023-10-24 DIAGNOSIS — E11.9 TYPE 2 DIABETES MELLITUS WITHOUT COMPLICATION, WITHOUT LONG-TERM CURRENT USE OF INSULIN: Primary | ICD-10-CM

## 2023-10-24 DIAGNOSIS — H25.89 OTHER AGE-RELATED CATARACT OF LEFT EYE: ICD-10-CM

## 2023-10-24 NOTE — PROGRESS NOTES
Health Maintenance Due   Topic Date Due    Hepatitis C Screening  Never done    Diabetes Urine Screening  Never done    Eye Exam  Never done    Shingles Vaccine (2 of 3) 12/09/2016    Influenza Vaccine (1) 09/01/2023    COVID-19 Vaccine (5 - 2023-24 season) 09/01/2023      BP f/u, mailed portal message to contact the office to schedule appointment.

## 2023-10-24 NOTE — LETTER
October 24, 2023    Gina Pa Bennett  [address]  [facility] [address]             Dear Gina    In addition to helping you feel better when you are sick, we are interested in preventing illness and injury in the first place.  Screening tests and routine follow up tests when you have certain medical problems are very essential in helping you stay as healthy as possible. In the spirit of maintaining your health, our system indicates that you are due for the following:Please contact the office to schedule the following.      Health Maintenance Due   Topic Date Due    Hepatitis C Screening  Never done    Diabetes Urine Screening  Never done    Eye Exam  Never done    Shingles Vaccine (2 of 3) 12/09/2016    Influenza Vaccine (1) 09/01/2023    COVID-19 Vaccine (5 - 2023-24 season) 09/01/2023        Please be prepared to discuss these recommended tests at your next visit.  If you haven't had a visit within the past one year please call [phone] to schedule or request an appointment.    Sincerely,       Your Health Care Team

## 2024-01-03 DIAGNOSIS — M81.0 OSTEOPOROSIS, UNSPECIFIED OSTEOPOROSIS TYPE, UNSPECIFIED PATHOLOGICAL FRACTURE PRESENCE: ICD-10-CM

## 2024-01-03 DIAGNOSIS — E78.5 HYPERLIPIDEMIA, UNSPECIFIED HYPERLIPIDEMIA TYPE: ICD-10-CM

## 2024-01-03 DIAGNOSIS — E11.9 TYPE 2 DIABETES MELLITUS WITHOUT COMPLICATION, WITHOUT LONG-TERM CURRENT USE OF INSULIN: Primary | ICD-10-CM

## 2024-01-03 DIAGNOSIS — I10 ESSENTIAL HYPERTENSION: ICD-10-CM

## 2024-01-04 ENCOUNTER — TELEPHONE (OUTPATIENT)
Dept: PRIMARY CARE CLINIC | Facility: CLINIC | Age: 79
End: 2024-01-04
Payer: MEDICARE

## 2024-01-04 NOTE — TELEPHONE ENCOUNTER
----- Message from Spencer Bennett MD sent at 1/3/2024  2:01 PM CST -----  Contact: Son / [facility] [phone]  Patient know that orders have been placed for her annual labs.  She can get them drawn any time prior to her annual appointment  ----- Message -----  From: Marielle Iverson MA  Sent: 1/3/2024  11:28 AM CST  To: Spencer Bennett MD      ----- Message -----  From: Sandro Serrano  Sent: 1/1/2024  11:27 PM CST  To: Marielle Iverson MA      ----- Message -----  From: Floridalma West  Sent: 12/28/2023  12:47 PM CST  To: Sabrina Coates Staff    type: Lab    Caller is requesting to schedule their Lab appointment prior to annual appointment.  Order is not listed in EPIC.  Please enter order and contact patient to schedule.    Thank you

## 2024-01-04 NOTE — TELEPHONE ENCOUNTER
Spoke with patients son, information was given, he verbally understood, will have labs prior to April appt.

## 2024-01-11 DIAGNOSIS — Z00.00 ENCOUNTER FOR MEDICARE ANNUAL WELLNESS EXAM: ICD-10-CM

## 2024-01-17 NOTE — TELEPHONE ENCOUNTER
Care Due:                  Date            Visit Type   Department     Provider  --------------------------------------------------------------------------------                                EP -                              PRIMARY      [facility] PRIMARY  Last Visit: 07-      CARE ([facility])   ARMANI BRO                              EP -                              PRIMARY      [facility] PRIMARY  Next Visit: 04-      CARE ([facility])   [facility]           GRETCHEN VELA                                                            Last  Test          Frequency    Reason                     Performed    Due Date  --------------------------------------------------------------------------------    Phosphate...  12 months..  alendronate..............  Not Found    Overdue    [employer] Embedded Care Due Messages. Reference number: [id number].   1/17/2024 4:12:11 PM CST

## 2024-01-18 RX ORDER — ATORVASTATIN CALCIUM 40 MG/1
TABLET, FILM COATED ORAL
Qty: 90 TABLET | Refills: 3 | Status: SHIPPED | OUTPATIENT
Start: 2024-01-18

## 2024-01-18 RX ORDER — METFORMIN HYDROCHLORIDE 500 MG/1
TABLET ORAL
Qty: 180 TABLET | Refills: 3 | Status: SHIPPED | OUTPATIENT
Start: 2024-01-18 | End: 2024-04-26

## 2024-01-18 NOTE — TELEPHONE ENCOUNTER
Refill Routing Note   Medication(s) are not appropriate for processing by [facility] for the following reason(s):        No active prescription written by provider: Patient is out of refills and requesting refills for maintanence medications. Last presribed by former physician Dr DEISI Yañez     [facility] action(s):  Defer      Medication Therapy Plan:  Patient is out of refills and requesting refills for maintanence medications. Last presribed by former physician Dr DEISI Yañez      Appointments  past 12m or future 3m with PCP    Date Provider   Last Visit   7/24/2023 Spencer Bennett MD   Next Visit   4/26/2024 Spencer Bennett MD   ED visits in past 90 days: 0        Note composed:11:48 AM 01/18/2024

## 2024-03-21 ENCOUNTER — OFFICE VISIT (OUTPATIENT)
Dept: INTERNAL MEDICINE | Facility: CLINIC | Age: 79
End: 2024-03-21
Payer: MEDICARE

## 2024-03-21 ENCOUNTER — LAB VISIT (OUTPATIENT)
Dept: LAB | Facility: HOSPITAL | Age: 79
End: 2024-03-21
Payer: MEDICARE

## 2024-03-21 VITALS
OXYGEN SATURATION: 98 % | SYSTOLIC BLOOD PRESSURE: 130 MMHG | HEART RATE: 96 BPM | DIASTOLIC BLOOD PRESSURE: 70 MMHG | WEIGHT: 126.31 LBS | BODY MASS INDEX: 24.8 KG/M2

## 2024-03-21 DIAGNOSIS — Z00.00 ENCOUNTER FOR PREVENTIVE HEALTH EXAMINATION: Primary | ICD-10-CM

## 2024-03-21 DIAGNOSIS — I10 ESSENTIAL HYPERTENSION: ICD-10-CM

## 2024-03-21 DIAGNOSIS — E11.9 TYPE 2 DIABETES MELLITUS WITHOUT COMPLICATION, WITHOUT LONG-TERM CURRENT USE OF INSULIN: ICD-10-CM

## 2024-03-21 DIAGNOSIS — Z74.09 OTHER REDUCED MOBILITY: ICD-10-CM

## 2024-03-21 DIAGNOSIS — M81.0 OSTEOPOROSIS, UNSPECIFIED OSTEOPOROSIS TYPE, UNSPECIFIED PATHOLOGICAL FRACTURE PRESENCE: ICD-10-CM

## 2024-03-21 DIAGNOSIS — Z13.89 ENCOUNTER FOR SCREENING FOR OTHER DISORDER: ICD-10-CM

## 2024-03-21 DIAGNOSIS — I70.0 CALCIFICATION OF ABDOMINAL AORTA: ICD-10-CM

## 2024-03-21 DIAGNOSIS — H53.8 BLURRED VISION: ICD-10-CM

## 2024-03-21 DIAGNOSIS — E78.5 HYPERLIPIDEMIA, UNSPECIFIED HYPERLIPIDEMIA TYPE: ICD-10-CM

## 2024-03-21 DIAGNOSIS — R73.03 PREDIABETES: ICD-10-CM

## 2024-03-21 DIAGNOSIS — R25.2 MUSCLE CRAMPS: ICD-10-CM

## 2024-03-21 LAB
ALBUMIN SERPL BCP-MCNC: 4.2 G/DL (ref 3.5–5.2)
ALP SERPL-CCNC: 82 U/L (ref 55–135)
ALT SERPL W/O P-5'-P-CCNC: 37 U/L (ref 10–44)
ANION GAP SERPL CALC-SCNC: 11 MMOL/L (ref 8–16)
AST SERPL-CCNC: 41 U/L (ref 10–40)
BASOPHILS # BLD AUTO: 0.06 K/UL (ref 0–0.2)
BASOPHILS NFR BLD: 0.7 % (ref 0–1.9)
BILIRUB SERPL-MCNC: 0.5 MG/DL (ref 0.1–1)
BUN SERPL-MCNC: 13 MG/DL (ref 8–23)
CALCIUM SERPL-MCNC: 9.7 MG/DL (ref 8.7–10.5)
CHLORIDE SERPL-SCNC: 95 MMOL/L (ref 95–110)
CHOLEST SERPL-MCNC: 122 MG/DL (ref 120–199)
CHOLEST/HDLC SERPL: 2.4 {RATIO} (ref 2–5)
CO2 SERPL-SCNC: 25 MMOL/L (ref 23–29)
CREAT SERPL-MCNC: 0.9 MG/DL (ref 0.5–1.4)
DIFFERENTIAL METHOD BLD: ABNORMAL
EOSINOPHIL # BLD AUTO: 0.7 K/UL (ref 0–0.5)
EOSINOPHIL NFR BLD: 7.4 % (ref 0–8)
ERYTHROCYTE [DISTWIDTH] IN BLOOD BY AUTOMATED COUNT: 13.2 % (ref 11.5–14.5)
EST. GFR  (NO RACE VARIABLE): >60 ML/MIN/1.73 M^2
ESTIMATED AVG GLUCOSE: 194 MG/DL (ref 68–131)
GLUCOSE SERPL-MCNC: 139 MG/DL (ref 70–110)
HBA1C MFR BLD: 8.4 % (ref 4–5.6)
HCT VFR BLD AUTO: 38.5 % (ref 37–48.5)
HCV AB SERPL QL IA: NORMAL
HDLC SERPL-MCNC: 50 MG/DL (ref 40–75)
HDLC SERPL: 41 % (ref 20–50)
HGB BLD-MCNC: 12.7 G/DL (ref 12–16)
IMM GRANULOCYTES # BLD AUTO: 0.03 K/UL (ref 0–0.04)
IMM GRANULOCYTES NFR BLD AUTO: 0.3 % (ref 0–0.5)
LDLC SERPL CALC-MCNC: 32 MG/DL (ref 63–159)
LYMPHOCYTES # BLD AUTO: 2.7 K/UL (ref 1–4.8)
LYMPHOCYTES NFR BLD: 31.1 % (ref 18–48)
MCH RBC QN AUTO: 30.7 PG (ref 27–31)
MCHC RBC AUTO-ENTMCNC: 33 G/DL (ref 32–36)
MCV RBC AUTO: 93 FL (ref 82–98)
MONOCYTES # BLD AUTO: 0.7 K/UL (ref 0.3–1)
MONOCYTES NFR BLD: 7.9 % (ref 4–15)
NEUTROPHILS # BLD AUTO: 4.6 K/UL (ref 1.8–7.7)
NEUTROPHILS NFR BLD: 52.6 % (ref 38–73)
NONHDLC SERPL-MCNC: 72 MG/DL
NRBC BLD-RTO: 0 /100 WBC
PLATELET # BLD AUTO: 238 K/UL (ref 150–450)
PMV BLD AUTO: 9.6 FL (ref 9.2–12.9)
POTASSIUM SERPL-SCNC: 4.6 MMOL/L (ref 3.5–5.1)
PROT SERPL-MCNC: 7.4 G/DL (ref 6–8.4)
RBC # BLD AUTO: 4.14 M/UL (ref 4–5.4)
SODIUM SERPL-SCNC: 131 MMOL/L (ref 136–145)
TRIGL SERPL-MCNC: 200 MG/DL (ref 30–150)
WBC # BLD AUTO: 8.74 K/UL (ref 3.9–12.7)

## 2024-03-21 PROCEDURE — 83036 HEMOGLOBIN GLYCOSYLATED A1C: CPT | Mod: HCNC | Performed by: FAMILY MEDICINE

## 2024-03-21 PROCEDURE — 86803 HEPATITIS C AB TEST: CPT | Mod: HCNC | Performed by: FAMILY MEDICINE

## 2024-03-21 PROCEDURE — 1101F PT FALLS ASSESS-DOCD LE1/YR: CPT | Mod: HCNC,CPTII,S$GLB, | Performed by: NURSE PRACTITIONER

## 2024-03-21 PROCEDURE — 3078F DIAST BP <80 MM HG: CPT | Mod: HCNC,CPTII,S$GLB, | Performed by: NURSE PRACTITIONER

## 2024-03-21 PROCEDURE — 3288F FALL RISK ASSESSMENT DOCD: CPT | Mod: HCNC,CPTII,S$GLB, | Performed by: NURSE PRACTITIONER

## 2024-03-21 PROCEDURE — 80053 COMPREHEN METABOLIC PANEL: CPT | Mod: HCNC | Performed by: FAMILY MEDICINE

## 2024-03-21 PROCEDURE — 1170F FXNL STATUS ASSESSED: CPT | Mod: HCNC,CPTII,S$GLB, | Performed by: NURSE PRACTITIONER

## 2024-03-21 PROCEDURE — G0439 PPPS, SUBSEQ VISIT: HCPCS | Mod: HCNC,S$GLB,, | Performed by: NURSE PRACTITIONER

## 2024-03-21 PROCEDURE — 3075F SYST BP GE 130 - 139MM HG: CPT | Mod: HCNC,CPTII,S$GLB, | Performed by: NURSE PRACTITIONER

## 2024-03-21 PROCEDURE — 1159F MED LIST DOCD IN RCRD: CPT | Mod: HCNC,CPTII,S$GLB, | Performed by: NURSE PRACTITIONER

## 2024-03-21 PROCEDURE — 36415 COLL VENOUS BLD VENIPUNCTURE: CPT | Mod: HCNC | Performed by: FAMILY MEDICINE

## 2024-03-21 PROCEDURE — 80061 LIPID PANEL: CPT | Mod: HCNC | Performed by: FAMILY MEDICINE

## 2024-03-21 PROCEDURE — 85025 COMPLETE CBC W/AUTO DIFF WBC: CPT | Mod: HCNC | Performed by: FAMILY MEDICINE

## 2024-03-21 PROCEDURE — 1160F RVW MEDS BY RX/DR IN RCRD: CPT | Mod: HCNC,CPTII,S$GLB, | Performed by: NURSE PRACTITIONER

## 2024-03-21 PROCEDURE — G9919 SCRN ND POS ND PROV OF REC: HCPCS | Mod: HCNC,CPTII,S$GLB, | Performed by: NURSE PRACTITIONER

## 2024-03-21 PROCEDURE — 99999 PR PBB SHADOW E&M-EST. PATIENT-LVL III: CPT | Mod: PBBFAC,HCNC,, | Performed by: NURSE PRACTITIONER

## 2024-03-21 NOTE — Clinical Note
Good Afternoon Dr Bennett, I saw your patient today for a medicare annual wellness visit. She reports severe muscle cramps that occur at night. You have seen her for this issue and she reports the flexeril makes her to sleepy. I advised her to do stretches at night, to start a daily walk, increase hydration, and get labs drawn that you ordered to check electrolytes. Just wanted to let you know.  Thank you, Laya Lopez NP

## 2024-03-21 NOTE — PATIENT INSTRUCTIONS
Counseling and Referral of Other Preventative  (Italic type indicates deductible and co-insurance are waived)    Patient Name: Gina Bennett  Today's Date: 3/21/2024    Health Maintenance       Date Due Completion Date    Hepatitis C Screening Never done ---    Diabetes Urine Screening Never done ---    Eye Exam Never done ---    RSV Vaccine (Age 60+ and Pregnant patients) (1 - 1-dose 60+ series) Never done ---    Shingles Vaccine (2 of 3) 12/09/2016 10/14/2016    Influenza Vaccine (1) 09/01/2023 10/5/2021    COVID-19 Vaccine (5 - 2023-24 season) 09/01/2023 10/11/2021    Hemoglobin A1c 01/24/2024 7/24/2023    Lipid Panel 04/24/2024 4/24/2023    DEXA Scan 07/26/2026 7/26/2023    TETANUS VACCINE 10/21/2026 10/21/2016        Orders Placed This Encounter   Procedures    Ambulatory referral/consult to Optometry     The following information is provided to all patients.  This information is to help you find resources for any of the problems found today that may be affecting your health:                  Living healthy guide: www.[facility].[place].gov      Understanding Diabetes: www.diabetes.org      Eating healthy: www.cdc.gov/healthyweight      CDC home safety checklist: www.cdc.gov/steadi/patient.html      Agency on Aging: www.goea.[place].[facility]      Alcoholics anonymous (AA): www.aa.org      Physical Activity: www.armida.nih.gov/ia6ddgl      Tobacco use: www.quitwithusla.org

## 2024-03-21 NOTE — PROGRESS NOTES
Gina Bennett presented for a  Medicare AWV and comprehensive Health Risk Assessment today. The following components were reviewed and updated:    Medical history  Family History  Social history  Allergies and Current Medications  Health Risk Assessment  Health Maintenance  Care Team         ** See Completed Assessments for Annual Wellness Visit within the encounter summary.**         The following assessments were completed:  Living Situation  CAGE  Depression Screening  Timed Get Up and Go  Whisper Test  Cognitive Function Screening  Nutrition Screening  ADL Screening  PAQ Screening    Patient reports muscle cramps to legs that where severe last night. It extend to her lower leg. Red leaf plant tea.   Opioid documentation:      Patient does not have a current opioid prescription.        Vitals:    03/21/24 1020   BP: 130/70   Pulse: 96   SpO2: 98%   Weight: 57.3 kg (126 lb 5.2 oz)     Body mass index is 24.8 kg/m².  Physical Exam  Vitals and nursing note reviewed.   Constitutional:       Appearance: She is well-developed.   HENT:      Head: Normocephalic and atraumatic.      Right Ear: External ear normal.      Left Ear: External ear normal.      Nose: Nose normal.   Eyes:      Pupils: Pupils are equal, round, and reactive to light.   Cardiovascular:      Rate and Rhythm: Normal rate and regular rhythm.      Heart sounds: Normal heart sounds.   Pulmonary:      Effort: Pulmonary effort is normal.      Breath sounds: Normal breath sounds.   Abdominal:      General: Bowel sounds are normal.      Palpations: Abdomen is soft.   Musculoskeletal:         General: Normal range of motion.      Cervical back: Normal range of motion.   Skin:     General: Skin is warm and dry.   Neurological:      Mental Status: She is alert and oriented to person, place, and time.               Diagnoses and health risks identified today and associated recommendations/orders:    1. Encounter for preventive health examination  Health  "Maintenance updated   Records reviewed   Exam done      2. Prediabetes  Stable and chronic. Continue current medications. Followed by PCP.     3. Calcification of abdominal aorta  Noted on CT scan 4/01/2020. Stable and chronic.     4. Blurred vision  - Ambulatory referral/consult to Optometry; Future    5. BMI 24.0-24.9, adult  BMI reviewed.    6. Other reduced mobility  Stable and chronic. She reports a fall on the past new moon. She landed on her hands. She does not use mobility device. She declines PT.     7. Encounter for screening for other disorder  She was able to repeat 3 words, however her clock drawing was abnormal. She reports no difficulty in memory. Will refer to PCP.    8. Muscle cramps  Patient reports severe muscle cramps that occur at night. She reports severe pain which required her to take an advil. She reports that she only drinks water when thirsty. She drinks mainly a "red leaf tea." She has not tried the tonic water that was recommended by her PCP. She reports the flexeril makes her too sleepy. She no longer does a daily walk. Although she reports she is constantly moving. Advised patient to get labs ordered by PCP. Discussed daily stretches and to increase water intake. Reduce tea and try tonic water recommended by PCP.     Counseling and Referral of Other Preventative  (Italic type indicates deductible and co-insurance are waived)    Patient Name: Gina Bennett  Today's Date: 3/21/2024    Health Maintenance         Date Due Completion Date    Hepatitis C Screening Never done ---    Diabetes Urine Screening Never done ---    Eye Exam Never done ---    RSV Vaccine (Age 60+ and Pregnant patients) (1 - 1-dose 60+ series) Never done ---    Shingles Vaccine (2 of 3) 12/09/2016 10/14/2016    Influenza Vaccine (1) 09/01/2023 10/5/2021    COVID-19 Vaccine (5 - 2023-24 season) 09/01/2023 10/11/2021    Hemoglobin A1c 01/24/2024 7/24/2023    Lipid Panel 04/24/2024 4/24/2023    DEXA Scan 07/26/2026 " 7/26/2023    TETANUS VACCINE 10/21/2026 10/21/2016          Orders Placed This Encounter   Procedures    Ambulatory referral/consult to Optometry     Provided Gina with a 5-10 year written screening schedule and personal prevention plan. Recommendations were developed using the USPSTF age appropriate recommendations. Education, counseling, and referrals were provided as needed. After Visit Summary printed and given to patient which includes a list of additional screenings\tests needed.    Follow up in about 5 weeks (around 4/26/2024) for PCP visit.    Laya Lopez, NP    I offered to discuss advanced care planning, including how to pick a person who would make decisions for you if you were unable to make them for yourself, called a health care power of , and what kind of decisions you might make such as use of life sustaining treatments such as ventilators and tube feeding when faced with a life limiting illness recorded on a living will that they will need to know. (How you want to be cared for as you near the end of your natural life)     X Patient is interested in learning more about how to make advanced directives.  I provided them paperwork and offered to discuss this with them.

## 2024-03-25 RX ORDER — LISINOPRIL AND HYDROCHLOROTHIAZIDE 20; 25 MG/1; MG/1
TABLET ORAL
Qty: 90 TABLET | Refills: 1 | Status: SHIPPED | OUTPATIENT
Start: 2024-03-25

## 2024-03-25 NOTE — TELEPHONE ENCOUNTER
----- Message from Sabine Trejo sent at 3/25/2024  9:38 AM CDT -----  Contact: [phone]  Requesting an RX refill or new RX.  Is this a refill or new RX: refill  RX name and strength   lisinopril-hydrochlorothiazide (PRINZIDE,ZESTORETIC) 10-12.5 mg per tablet  Is this a 30 day or 90 day RX: 90  Pharmacy name and phone # :  [facility] - [place], [place] - [address]   Phone: [phone]  Fax: [phone]    The doctors have asked that we provide their patients with the following 2 reminders -- prescription refills can take up to 72 hours, and a friendly reminder that in the future you can use your [facility] account to request refills: yes

## 2024-03-26 RX ORDER — LISINOPRIL AND HYDROCHLOROTHIAZIDE 10; 12.5 MG/1; MG/1
1 TABLET ORAL DAILY
Qty: 30 TABLET | Refills: 2 | Status: SHIPPED | OUTPATIENT
Start: 2024-03-26 | End: 2024-04-01 | Stop reason: CLARIF

## 2024-04-01 ENCOUNTER — TELEPHONE (OUTPATIENT)
Dept: PRIMARY CARE CLINIC | Facility: CLINIC | Age: 79
End: 2024-04-01
Payer: MEDICARE

## 2024-04-01 NOTE — TELEPHONE ENCOUNTER
----- Message from Sandro Serrano sent at 3/26/2024  9:28 PM CDT -----  Contact: [facility] [phone]    ----- Message -----  From: Marleny Arora  Sent: 3/26/2024   2:19 PM CDT  To: Sabrina Coates Staff    Pharmacy is calling to see what prescription the patient should be on, they received 2 different doses.    lisinopriL-hydrochlorothiazide (PRINZIDE,ZESTORETIC) 20-25 mg Tab  / Dr Bennett    lisinopriL-hydrochlorothiazide (PRINZIDE,ZESTORETIC) 10-12.5 mg per tablet / Dr Lopes      Please call and advise.    Thank You

## 2024-04-03 ENCOUNTER — PATIENT MESSAGE (OUTPATIENT)
Dept: ADMINISTRATIVE | Facility: HOSPITAL | Age: 79
End: 2024-04-03
Payer: MEDICARE

## 2024-04-26 ENCOUNTER — LAB VISIT (OUTPATIENT)
Dept: PRIMARY CARE CLINIC | Facility: CLINIC | Age: 79
End: 2024-04-26
Payer: MEDICARE

## 2024-04-26 ENCOUNTER — OFFICE VISIT (OUTPATIENT)
Dept: PRIMARY CARE CLINIC | Facility: CLINIC | Age: 79
End: 2024-04-26
Payer: MEDICARE

## 2024-04-26 VITALS
HEIGHT: 61 IN | BODY MASS INDEX: 22.95 KG/M2 | OXYGEN SATURATION: 98 % | SYSTOLIC BLOOD PRESSURE: 135 MMHG | DIASTOLIC BLOOD PRESSURE: 65 MMHG | TEMPERATURE: 100 F | WEIGHT: 121.56 LBS | HEART RATE: 67 BPM

## 2024-04-26 DIAGNOSIS — E87.1 HYPONATREMIA: ICD-10-CM

## 2024-04-26 DIAGNOSIS — I10 ESSENTIAL HYPERTENSION: ICD-10-CM

## 2024-04-26 DIAGNOSIS — M81.0 OSTEOPOROSIS, UNSPECIFIED OSTEOPOROSIS TYPE, UNSPECIFIED PATHOLOGICAL FRACTURE PRESENCE: ICD-10-CM

## 2024-04-26 DIAGNOSIS — E11.9 TYPE 2 DIABETES MELLITUS WITHOUT COMPLICATION, WITHOUT LONG-TERM CURRENT USE OF INSULIN: Primary | ICD-10-CM

## 2024-04-26 LAB
25(OH)D3+25(OH)D2 SERPL-MCNC: 34 NG/ML (ref 30–96)
ANION GAP SERPL CALC-SCNC: 12 MMOL/L (ref 8–16)
BUN SERPL-MCNC: 12 MG/DL (ref 8–23)
CALCIUM SERPL-MCNC: 9.5 MG/DL (ref 8.7–10.5)
CHLORIDE SERPL-SCNC: 96 MMOL/L (ref 95–110)
CO2 SERPL-SCNC: 23 MMOL/L (ref 23–29)
CREAT SERPL-MCNC: 0.9 MG/DL (ref 0.5–1.4)
EST. GFR  (NO RACE VARIABLE): >60 ML/MIN/1.73 M^2
GLUCOSE SERPL-MCNC: 110 MG/DL (ref 70–110)
POTASSIUM SERPL-SCNC: 4.2 MMOL/L (ref 3.5–5.1)
SODIUM SERPL-SCNC: 131 MMOL/L (ref 136–145)

## 2024-04-26 PROCEDURE — 99999 PR PBB SHADOW E&M-EST. PATIENT-LVL III: CPT | Mod: PBBFAC,HCNC,, | Performed by: FAMILY MEDICINE

## 2024-04-26 PROCEDURE — 1101F PT FALLS ASSESS-DOCD LE1/YR: CPT | Mod: HCNC,CPTII,S$GLB, | Performed by: FAMILY MEDICINE

## 2024-04-26 PROCEDURE — 82306 VITAMIN D 25 HYDROXY: CPT | Mod: HCNC | Performed by: FAMILY MEDICINE

## 2024-04-26 PROCEDURE — 99214 OFFICE O/P EST MOD 30 MIN: CPT | Mod: HCNC,S$GLB,, | Performed by: FAMILY MEDICINE

## 2024-04-26 PROCEDURE — 3078F DIAST BP <80 MM HG: CPT | Mod: HCNC,CPTII,S$GLB, | Performed by: FAMILY MEDICINE

## 2024-04-26 PROCEDURE — 3288F FALL RISK ASSESSMENT DOCD: CPT | Mod: HCNC,CPTII,S$GLB, | Performed by: FAMILY MEDICINE

## 2024-04-26 PROCEDURE — 3075F SYST BP GE 130 - 139MM HG: CPT | Mod: HCNC,CPTII,S$GLB, | Performed by: FAMILY MEDICINE

## 2024-04-26 PROCEDURE — 1125F AMNT PAIN NOTED PAIN PRSNT: CPT | Mod: HCNC,CPTII,S$GLB, | Performed by: FAMILY MEDICINE

## 2024-04-26 PROCEDURE — 1159F MED LIST DOCD IN RCRD: CPT | Mod: HCNC,CPTII,S$GLB, | Performed by: FAMILY MEDICINE

## 2024-04-26 PROCEDURE — 80048 BASIC METABOLIC PNL TOTAL CA: CPT | Mod: HCNC | Performed by: FAMILY MEDICINE

## 2024-04-26 RX ORDER — METFORMIN HYDROCHLORIDE 500 MG/1
1000 TABLET, EXTENDED RELEASE ORAL 2 TIMES DAILY
Qty: 360 TABLET | Refills: 3 | Status: SHIPPED | OUTPATIENT
Start: 2024-04-26 | End: 2025-04-26

## 2024-04-26 RX ORDER — ERGOCALCIFEROL 1.25 MG/1
50000 CAPSULE ORAL
Qty: 12 CAPSULE | Refills: 3 | Status: SHIPPED | OUTPATIENT
Start: 2024-04-26

## 2024-04-26 RX ORDER — ALENDRONATE SODIUM 70 MG/1
70 TABLET ORAL
Qty: 12 TABLET | Refills: 3 | Status: SHIPPED | OUTPATIENT
Start: 2024-04-26 | End: 2025-04-26

## 2024-04-26 NOTE — PROGRESS NOTES
Clinic Note  4/26/2024      Subjective:       Patient ID:  Gina is a 79 y.o. female being seen for an established visit.    Chief Complaint: Annual Exam    Annual exam-patient with cataracts, hypertension, type 2 diabetes, osteoporosis here for annual exam     Hypertension-taking lisinopril and hydrochlorothiazide.  Recently had labs drawn which showed hyponatremia, sodium 132 with blood glucose of 139    Type 2 diabetes-taking metformin twice daily, recent hemoglobin A1c 8.4.  Over last several years patient has been making dietary changes and has been losing weight.  Weight has stabilized over the last year.  Does avoid eating certain meats and seafood, however still does eat noodles and rice and bread    Fall-2 months ago had a fall.  Unclear what happen.  Did not trip or have any weakness in the legs.  Did not have any dizziness.    Review of Systems   Constitutional:  Negative for chills, fever, malaise/fatigue and weight loss.   HENT:  Negative for congestion, sinus pain and sore throat.    Respiratory:  Negative for cough, shortness of breath and wheezing.    Cardiovascular:  Negative for chest pain and palpitations.   Gastrointestinal:  Negative for constipation, diarrhea, nausea and vomiting.   Genitourinary:  Negative for dysuria, frequency and urgency.   Musculoskeletal:  Negative for myalgias.   Skin:  Negative for rash.   Neurological:  Negative for headaches.       Medication List with Changes/Refills   New Medications    METFORMIN (GLUCOPHAGE-XR) 500 MG ER 24HR TABLET    Take 2 tablets (1,000 mg total) by mouth 2 (two) times a day.   Current Medications    ATORVASTATIN (LIPITOR) 40 MG TABLET    UONG 1 ROLAND [facility] NGAY ([facility]) - TAKE ONE TABLET BY MOUTH EVERY DAY    LISINOPRIL-HYDROCHLOROTHIAZIDE (PRINZIDE,ZESTORETIC) 20-25 MG TAB    UONG 1 ROLAND [facility] NGAY ([facility]) - TAKE ONE TABLET BY MOUTH EVERY DAY    TRIAMCINOLONE ACETONIDE 0.1% (KENALOG) 0.1 % CREAM    APPLY TO THE AFFECTED AREA(S) topically  "TWICE DAILY FOR ITCHING   Changed and/or Refilled Medications    Modified Medication Previous Medication    ALENDRONATE (FOSAMAX) 70 MG TABLET alendronate (FOSAMAX) 70 MG tablet       Take 1 tablet (70 mg total) by mouth every 7 days. Clinton xuong    Take 1 tablet (70 mg total) by mouth every 7 days. Loflorentino xuong    ERGOCALCIFEROL (ERGOCALCIFEROL) 50,000 UNIT CAP ergocalciferol (ERGOCALCIFEROL) 50,000 unit Cap       Take 1 capsule (50,000 Units total) by mouth every 7 days. Heri xuong    Take 1 capsule (50,000 Units total) by mouth every 7 days. Heri fuchs   Discontinued Medications    CYCLOBENZAPRINE (FLEXERIL) 5 MG TABLET    Take 5 mg by mouth once daily.    METFORMIN (GLUCOPHAGE) 500 MG TABLET    UONG 1 ROLAND [facility] NGAY 2 BRITTNEY (CARRIE CORNELIUS) - TAKE ONE TABLET BY MOUTH TWICE DAILY       Patient Active Problem List   Diagnosis    Cataract, left    Diabetes mellitus           Objective:      /65 (BP Location: Left arm, Patient Position: Sitting, BP Method: Large (Automatic))   Pulse 67   Temp 99.5 °F (37.5 °C) (Oral)   Ht 5' 1" (1.549 m)   Wt 55.2 kg (121 lb 9.3 oz)   SpO2 98%   BMI 22.97 kg/m²   Estimated body mass index is 22.97 kg/m² as calculated from the following:    Height as of this encounter: 5' 1" (1.549 m).    Weight as of this encounter: 55.2 kg (121 lb 9.3 oz).  Physical Exam  Vitals reviewed.   Constitutional:       General: She is not in acute distress.     Appearance: She is not diaphoretic.      Comments: Elderly female   HENT:      Head: Normocephalic and atraumatic.   Eyes:      Conjunctiva/sclera: Conjunctivae normal.   Cardiovascular:      Rate and Rhythm: Normal rate and regular rhythm.      Heart sounds: Normal heart sounds.   Pulmonary:      Effort: Pulmonary effort is normal. No respiratory distress.      Breath sounds: Normal breath sounds. No wheezing.   Abdominal:      General: Bowel sounds are normal.      Palpations: Abdomen is soft.   Musculoskeletal:         General: Normal range of " motion.      Cervical back: Normal range of motion.   Skin:     General: Skin is warm and dry.      Findings: No erythema or rash.   Neurological:      Mental Status: She is alert and oriented to person, place, and time.   Psychiatric:         Mood and Affect: Mood and affect normal.         Behavior: Behavior normal.         Thought Content: Thought content normal.         Judgment: Judgment normal.           Assessment and Plan:     1. Type 2 diabetes mellitus without complication, without long-term current use of insulin  - diabetes not well controlled, hemoglobin A1c 8.4, goal of 7.  Will increase to metformin 1000 mg b.i.d.  - metFORMIN (GLUCOPHAGE-XR) 500 MG ER 24hr tablet; Take 2 tablets (1,000 mg total) by mouth 2 (two) times a day.  Dispense: 360 tablet; Refill: 3  - Hemoglobin A1C; Future    2. Osteoporosis, unspecified osteoporosis type, unspecified pathological fracture presence  - alendronate (FOSAMAX) 70 MG tablet; Take 1 tablet (70 mg total) by mouth every 7 days. Loang xuong  Dispense: 12 tablet; Refill: 3  - ergocalciferol (ERGOCALCIFEROL) 50,000 unit Cap; Take 1 capsule (50,000 Units total) by mouth every 7 days. Heri xuong  Dispense: 12 capsule; Refill: 3  - Vitamin D; Future    3. Essential hypertension  - blood pressure controlled    4. Hyponatremia  - sodium level 131, likely due to hydrochlorothiazide.  Will repeat  - Basic Metabolic Panel; Future      Follow Up:   Follow up for labs today (BMP and Vit D), schedule for HA1c in 2 months, 3 months w/ Dr. Bennett.    Other Orders Placed This Visit:  Orders Placed This Encounter   Procedures    Hemoglobin A1C    Vitamin D    Basic Metabolic Panel         Spencer Bennett MD        This note is dictated on M*Modal word recognition program.  There are word recognition mistakes that are occasionally missed on review.

## 2024-07-09 ENCOUNTER — PATIENT MESSAGE (OUTPATIENT)
Dept: ADMINISTRATIVE | Facility: HOSPITAL | Age: 79
End: 2024-07-09
Payer: MEDICARE

## 2024-07-26 ENCOUNTER — OFFICE VISIT (OUTPATIENT)
Dept: PRIMARY CARE CLINIC | Facility: CLINIC | Age: 79
End: 2024-07-26
Payer: MEDICARE

## 2024-07-26 VITALS
BODY MASS INDEX: 22.31 KG/M2 | SYSTOLIC BLOOD PRESSURE: 114 MMHG | WEIGHT: 118.19 LBS | HEART RATE: 65 BPM | HEIGHT: 61 IN | OXYGEN SATURATION: 98 % | DIASTOLIC BLOOD PRESSURE: 70 MMHG

## 2024-07-26 DIAGNOSIS — E11.9 TYPE 2 DIABETES MELLITUS WITHOUT COMPLICATION, WITHOUT LONG-TERM CURRENT USE OF INSULIN: ICD-10-CM

## 2024-07-26 DIAGNOSIS — M25.551 BILATERAL HIP PAIN: ICD-10-CM

## 2024-07-26 DIAGNOSIS — L29.9 ITCHY SKIN: ICD-10-CM

## 2024-07-26 DIAGNOSIS — M25.552 BILATERAL HIP PAIN: ICD-10-CM

## 2024-07-26 DIAGNOSIS — H26.9 CATARACT OF LEFT EYE, UNSPECIFIED CATARACT TYPE: ICD-10-CM

## 2024-07-26 DIAGNOSIS — H53.8 BLURRY VISION: Primary | ICD-10-CM

## 2024-07-26 DIAGNOSIS — E87.1 HYPONATREMIA: ICD-10-CM

## 2024-07-26 PROCEDURE — 99999 PR PBB SHADOW E&M-EST. PATIENT-LVL IV: CPT | Mod: PBBFAC,HCNC,, | Performed by: FAMILY MEDICINE

## 2024-07-26 RX ORDER — TRIAMCINOLONE ACETONIDE 1 MG/G
CREAM TOPICAL 2 TIMES DAILY
Qty: 30 G | Refills: 1 | Status: SHIPPED | OUTPATIENT
Start: 2024-07-26

## 2024-07-26 RX ORDER — DICLOFENAC SODIUM 10 MG/G
2 GEL TOPICAL 4 TIMES DAILY
Qty: 100 G | Refills: 1 | Status: SHIPPED | OUTPATIENT
Start: 2024-07-26

## 2024-07-26 NOTE — PATIENT INSTRUCTIONS
[facility]    [address] ([facility] )  [address]  [facility] [address]     Ph: [phone]  Fax: [phone] or [phone]

## 2024-07-26 NOTE — PROGRESS NOTES
Clinic Note  7/26/2024      Subjective:       Patient ID:  Gina is a 79 y.o. female being seen for an established visit.    Chief Complaint: Follow-up (3 month follow up)    Follow up-patient with hypertension, type 2 diabetes, leg cramps, cataracts here for follow-up     Allergic reaction-patient reports that whenever she eats certain foods, she will have an allergic reaction.  Does not take any allergy medicine for this, uses triamcinolone cream which she is requesting a refill for    Hip pain-patient has intermittent bilateral hip pain.  Also intermittent right knee pain, pain slightly improved today.      Hyponatremia-currently on lisinopril and hydrochlorothiazide    Type 2 diabetes-recent hemoglobin A1c 6.5, on metformin 1000 mg b.i.d.    Blurry vision/history of cataracts-previously had surgery procedure in the eye, however unsure if she wants another surgical procedure for this.  Still having blurry vision despite wearing glasses    Review of Systems   Constitutional:  Negative for chills, fever, malaise/fatigue and weight loss.   HENT:  Negative for congestion, sinus pain and sore throat.    Respiratory:  Negative for cough, shortness of breath and wheezing.    Cardiovascular:  Negative for chest pain and palpitations.   Gastrointestinal:  Negative for constipation, diarrhea, nausea and vomiting.   Genitourinary:  Negative for dysuria, frequency and urgency.   Musculoskeletal:  Positive for joint pain and myalgias.   Skin:  Negative for rash.   Neurological:  Negative for headaches.       Medication List with Changes/Refills   New Medications    DICLOFENAC SODIUM (VOLTAREN) 1 % GEL    Apply 2 g topically 4 (four) times daily.   Current Medications    ALENDRONATE (FOSAMAX) 70 MG TABLET    Take 1 tablet (70 mg total) by mouth every 7 days. Clinton fuchs    ATORVASTATIN (LIPITOR) 40 MG TABLET    UONG 1 [facility] (JOHN MO) - TAKE ONE TABLET BY MOUTH EVERY DAY    ERGOCALCIFEROL (ERGOCALCIFEROL) 50,000 UNIT CAP    " Take 1 capsule (50,000 Units total) by mouth every 7 days. Heri fuchs    LISINOPRIL-HYDROCHLOROTHIAZIDE (PRINZIDE,ZESTORETIC) 20-25 MG TAB    UONG 1 ROLAND MYERS ([place]) - TAKE ONE TABLET BY MOUTH EVERY DAY    METFORMIN (GLUCOPHAGE-XR) 500 MG ER 24HR TABLET    Take 2 tablets (1,000 mg total) by mouth 2 (two) times a day.   Changed and/or Refilled Medications    Modified Medication Previous Medication    TRIAMCINOLONE ACETONIDE 0.1% (KENALOG) 0.1 % CREAM triamcinolone acetonide 0.1% (KENALOG) 0.1 % cream       Apply topically 2 (two) times daily.    APPLY TO THE AFFECTED AREA(S) topically TWICE DAILY FOR ITCHING       Patient Active Problem List   Diagnosis    Cataract, left    Diabetes mellitus           Objective:      /70 (BP Location: Right arm, Patient Position: Sitting, BP Method: Large (Automatic))   Pulse 65   Ht 5' 1" (1.549 m)   Wt 53.6 kg (118 lb 2.7 oz)   SpO2 98%   BMI 22.33 kg/m²   Estimated body mass index is 22.33 kg/m² as calculated from the following:    Height as of this encounter: 5' 1" (1.549 m).    Weight as of this encounter: 53.6 kg (118 lb 2.7 oz).  Physical Exam  Vitals reviewed.   Constitutional:       General: She is not in acute distress.     Appearance: She is not diaphoretic.   HENT:      Head: Normocephalic and atraumatic.   Eyes:      Conjunctiva/sclera: Conjunctivae normal.   Cardiovascular:      Rate and Rhythm: Normal rate and regular rhythm.      Heart sounds: Normal heart sounds.   Pulmonary:      Effort: Pulmonary effort is normal. No respiratory distress.      Breath sounds: Normal breath sounds. No wheezing.   Abdominal:      General: Bowel sounds are normal.      Palpations: Abdomen is soft.   Musculoskeletal:         General: Normal range of motion.      Cervical back: Normal range of motion.        Legs:    Skin:     General: Skin is warm and dry.      Findings: No erythema or rash.   Neurological:      Mental Status: She is alert and oriented to person, " place, and time.   Psychiatric:         Mood and Affect: Mood and affect normal.         Behavior: Behavior normal.         Thought Content: Thought content normal.         Judgment: Judgment normal.           Assessment and Plan:     1. Itchy skin  - deferred medication for any antihistamines  - triamcinolone acetonide 0.1% (KENALOG) 0.1 % cream; Apply topically 2 (two) times daily.  Dispense: 30 g; Refill: 1    2. Blurry vision / cataracts  - refer to ophthalmology within [facility]  - Ambulatory referral/consult to Ophthalmology; Future    3. Bilateral hip pain  - diclofenac sodium (VOLTAREN) 1 % Gel; Apply 2 g topically 4 (four) times daily.  Dispense: 100 g; Refill: 1    4. Hyponatremia  - recent sodium levels 131, discussed changing the hydrochlorothiazide to another blood pressure medication or monitoring.  Will monitor for now, recheck BMP in 3 months.  If still with hyponatremia, will stop the hydrochlorothiazide  - Basic Metabolic Panel; Future    5. Type 2 diabetes mellitus without complication, without long-term current use of insulin  - well-controlled, last hemoglobin A1c 6.5, continue metformin 1000 mg b.i.d.  - Hemoglobin A1C; Future      Follow Up:   Follow up in about 3 months (around 10/26/2024) for f/u and labs 1-2 days prior.    Other Orders Placed This Visit:  Orders Placed This Encounter   Procedures    Hemoglobin A1C    Basic Metabolic Panel    Ambulatory referral/consult to Ophthalmology         Spencer Bennett MD        This note is dictated on M*Modal word recognition program.  There are word recognition mistakes that are occasionally missed on review.

## 2024-08-07 DIAGNOSIS — M25.551 BILATERAL HIP PAIN: ICD-10-CM

## 2024-08-07 DIAGNOSIS — M25.552 BILATERAL HIP PAIN: ICD-10-CM

## 2024-08-07 NOTE — TELEPHONE ENCOUNTER
Care Due:                  Date            Visit Type   Department     Provider  --------------------------------------------------------------------------------                                EP -                              PRIMARY      [facility] PRIMARY  Last Visit: 07-      CARE ([facility])   ARMANI BRO                              EP -                              PRIMARY      [facility] PRIMARY  Next Visit: 10-      CARE ([facility])   ARMANI BRO                                                            Last  Test          Frequency    Reason                     Performed    Due Date  --------------------------------------------------------------------------------    Mg Level....  12 months..  alendronate..............  04- 04-    Phosphate...  12 months..  alendronate..............  Not Found    Overdue    [employer] Embedded Care Due Messages. Reference number: [id number].   8/07/2024 8:02:39 AM CDT

## 2024-08-12 DIAGNOSIS — L29.9 ITCHY SKIN: ICD-10-CM

## 2024-08-12 RX ORDER — TRIAMCINOLONE ACETONIDE 1 MG/G
CREAM TOPICAL 2 TIMES DAILY
Qty: 30 G | Refills: 1 | Status: SHIPPED | OUTPATIENT
Start: 2024-08-12

## 2024-08-12 NOTE — TELEPHONE ENCOUNTER
Refill Decision Note   Gina Sabrina  is requesting a refill authorization.  Brief Assessment and Rationale for Refill:  Approve     Medication Therapy Plan:         Comments:     Note composed:4:44 PM 08/12/2024             Appointments     Last Visit   7/26/2024 Spencer Bennett MD   Next Visit   10/28/2024 Spencer Bennett MD

## 2024-08-12 NOTE — TELEPHONE ENCOUNTER
No care due was identified.  [facility] Embedded Care Due Messages. Reference number: [id number].   8/12/2024 8:02:39 AM CDT

## 2024-08-15 RX ORDER — DICLOFENAC SODIUM 10 MG/G
GEL TOPICAL
Qty: 100 G | Refills: 1 | Status: SHIPPED | OUTPATIENT
Start: 2024-08-15

## 2024-09-09 RX ORDER — LISINOPRIL AND HYDROCHLOROTHIAZIDE 20; 25 MG/1; MG/1
TABLET ORAL
Qty: 90 TABLET | Refills: 3 | Status: SHIPPED | OUTPATIENT
Start: 2024-09-09

## 2024-09-09 NOTE — TELEPHONE ENCOUNTER
Refill Decision Note   Gina Bennett  is requesting a refill authorization.  Brief Assessment and Rationale for Refill:  Approve     Medication Therapy Plan:         Comments:     Note composed:3:12 PM 09/09/2024

## 2024-09-09 NOTE — TELEPHONE ENCOUNTER
No care due was identified.  [facility] Embedded Care Due Messages. Reference number: [id number].   9/09/2024 8:01:09 AM CDT

## 2024-09-19 NOTE — TELEPHONE ENCOUNTER
No care due was identified.  [facility] Embedded Care Due Messages. Reference number: [id number].   9/19/2024 12:41:27 PM CDT

## 2024-09-20 RX ORDER — LISINOPRIL AND HYDROCHLOROTHIAZIDE 20; 25 MG/1; MG/1
1 TABLET ORAL DAILY
Qty: 90 TABLET | Refills: 3 | Status: SHIPPED | OUTPATIENT
Start: 2024-09-20

## 2024-10-02 DIAGNOSIS — M25.552 BILATERAL HIP PAIN: ICD-10-CM

## 2024-10-02 DIAGNOSIS — M25.551 BILATERAL HIP PAIN: ICD-10-CM

## 2024-10-02 NOTE — TELEPHONE ENCOUNTER
No care due was identified.  [facility] Embedded Care Due Messages. Reference number: [id number].   10/02/2024 8:03:11 AM CDT

## 2024-10-07 DIAGNOSIS — L29.9 ITCHY SKIN: ICD-10-CM

## 2024-10-07 RX ORDER — TRIAMCINOLONE ACETONIDE 1 MG/G
CREAM TOPICAL 2 TIMES DAILY
Qty: 80 G | Refills: 1 | Status: SHIPPED | OUTPATIENT
Start: 2024-10-07

## 2024-10-07 NOTE — TELEPHONE ENCOUNTER
Refill Decision Note   Gina Bennett  is requesting a refill authorization.  Brief Assessment and Rationale for Refill:  Approve     Medication Therapy Plan:  30 g not lasting a whole month.      Comments:     Note composed:3:19 PM 10/07/2024

## 2024-10-07 NOTE — TELEPHONE ENCOUNTER
Care Due:                  Date            Visit Type   Department     Provider  --------------------------------------------------------------------------------                                EP -                              PRIMARY      [facility] PRIMARY  Last Visit: 07-      CARE ([facility])   ARMANI BRO                              EP -                              PRIMARY      [facility] PRIMARY  Next Visit: 10-      CARE ([facility])   ARMANI BRO                                                            Last  Test          Frequency    Reason                     Performed    Due Date  --------------------------------------------------------------------------------    Mg Level....  12 months..  alendronate..............  04- 04-    Phosphate...  12 months..  alendronate..............  Not Found    Overdue    [employer] Embedded Care Due Messages. Reference number: [id number].   10/07/2024 8:01:37 AM CDT

## 2024-10-10 RX ORDER — DICLOFENAC SODIUM 10 MG/G
GEL TOPICAL
Qty: 100 G | Refills: 1 | Status: SHIPPED | OUTPATIENT
Start: 2024-10-10

## 2024-10-28 ENCOUNTER — OFFICE VISIT (OUTPATIENT)
Dept: PRIMARY CARE CLINIC | Facility: CLINIC | Age: 79
End: 2024-10-28
Payer: MEDICARE

## 2024-10-28 VITALS
DIASTOLIC BLOOD PRESSURE: 75 MMHG | WEIGHT: 119.81 LBS | OXYGEN SATURATION: 99 % | HEIGHT: 60 IN | SYSTOLIC BLOOD PRESSURE: 120 MMHG | BODY MASS INDEX: 23.52 KG/M2 | HEART RATE: 69 BPM

## 2024-10-28 DIAGNOSIS — Z13.220 ENCOUNTER FOR LIPID SCREENING FOR CARDIOVASCULAR DISEASE: ICD-10-CM

## 2024-10-28 DIAGNOSIS — R29.6 FALLING: ICD-10-CM

## 2024-10-28 DIAGNOSIS — E11.9 TYPE 2 DIABETES MELLITUS WITHOUT COMPLICATION, WITHOUT LONG-TERM CURRENT USE OF INSULIN: ICD-10-CM

## 2024-10-28 DIAGNOSIS — H53.8 BLURRY VISION: ICD-10-CM

## 2024-10-28 DIAGNOSIS — M54.50 LUMBAR BACK PAIN: Primary | ICD-10-CM

## 2024-10-28 DIAGNOSIS — M25.559 HIP PAIN, UNSPECIFIED LATERALITY: ICD-10-CM

## 2024-10-28 DIAGNOSIS — Z13.6 ENCOUNTER FOR LIPID SCREENING FOR CARDIOVASCULAR DISEASE: ICD-10-CM

## 2024-10-28 DIAGNOSIS — I10 ESSENTIAL HYPERTENSION: ICD-10-CM

## 2024-10-28 PROCEDURE — 99214 OFFICE O/P EST MOD 30 MIN: CPT | Mod: HCNC,S$GLB,, | Performed by: FAMILY MEDICINE

## 2024-10-28 PROCEDURE — 3078F DIAST BP <80 MM HG: CPT | Mod: HCNC,CPTII,S$GLB, | Performed by: FAMILY MEDICINE

## 2024-10-28 PROCEDURE — 99999 PR PBB SHADOW E&M-EST. PATIENT-LVL III: CPT | Mod: PBBFAC,HCNC,, | Performed by: FAMILY MEDICINE

## 2024-10-28 PROCEDURE — 3074F SYST BP LT 130 MM HG: CPT | Mod: HCNC,CPTII,S$GLB, | Performed by: FAMILY MEDICINE

## 2024-10-28 RX ORDER — PREGABALIN 25 MG/1
25 CAPSULE ORAL 2 TIMES DAILY
Qty: 60 CAPSULE | Refills: 6 | Status: SHIPPED | OUTPATIENT
Start: 2024-10-28 | End: 2025-04-28

## 2024-11-08 DIAGNOSIS — M25.552 BILATERAL HIP PAIN: ICD-10-CM

## 2024-11-08 DIAGNOSIS — M25.551 BILATERAL HIP PAIN: ICD-10-CM

## 2024-11-08 NOTE — TELEPHONE ENCOUNTER
No care due was identified.  Health [facility] Embedded Care Due Messages. Reference number: [id number].   11/08/2024 8:01:19 AM CST

## 2024-11-10 RX ORDER — DICLOFENAC SODIUM 10 MG/G
GEL TOPICAL
Qty: 100 G | Refills: 1 | Status: SHIPPED | OUTPATIENT
Start: 2024-11-10

## 2024-12-30 ENCOUNTER — OFFICE VISIT (OUTPATIENT)
Dept: PRIMARY CARE CLINIC | Facility: CLINIC | Age: 79
End: 2024-12-30
Payer: MEDICARE

## 2024-12-30 VITALS
DIASTOLIC BLOOD PRESSURE: 60 MMHG | TEMPERATURE: 97 F | SYSTOLIC BLOOD PRESSURE: 115 MMHG | BODY MASS INDEX: 23.23 KG/M2 | HEART RATE: 68 BPM | WEIGHT: 118.94 LBS | OXYGEN SATURATION: 100 % | RESPIRATION RATE: 16 BRPM

## 2024-12-30 DIAGNOSIS — E11.9 TYPE 2 DIABETES MELLITUS WITHOUT COMPLICATION, WITHOUT LONG-TERM CURRENT USE OF INSULIN: ICD-10-CM

## 2024-12-30 DIAGNOSIS — E11.9 TYPE 2 DIABETES MELLITUS WITHOUT COMPLICATION, WITHOUT LONG-TERM CURRENT USE OF INSULIN: Primary | ICD-10-CM

## 2024-12-30 DIAGNOSIS — E11.22 TYPE 2 DIABETES MELLITUS WITH CHRONIC KIDNEY DISEASE, WITHOUT LONG-TERM CURRENT USE OF INSULIN, UNSPECIFIED CKD STAGE: ICD-10-CM

## 2024-12-30 DIAGNOSIS — M19.90 OSTEOARTHRITIS, UNSPECIFIED OSTEOARTHRITIS TYPE, UNSPECIFIED SITE: Primary | ICD-10-CM

## 2024-12-30 LAB
ALBUMIN/CREAT UR: 10.8 UG/MG (ref 0–30)
CREAT UR-MCNC: 130 MG/DL (ref 15–325)
MICROALBUMIN UR DL<=1MG/L-MCNC: 14 UG/ML

## 2024-12-30 PROCEDURE — 99999 PR PBB SHADOW E&M-EST. PATIENT-LVL III: CPT | Mod: PBBFAC,HCNC,, | Performed by: FAMILY MEDICINE

## 2024-12-30 PROCEDURE — 1126F AMNT PAIN NOTED NONE PRSNT: CPT | Mod: HCNC,CPTII,S$GLB, | Performed by: FAMILY MEDICINE

## 2024-12-30 PROCEDURE — 1159F MED LIST DOCD IN RCRD: CPT | Mod: HCNC,CPTII,S$GLB, | Performed by: FAMILY MEDICINE

## 2024-12-30 PROCEDURE — 1101F PT FALLS ASSESS-DOCD LE1/YR: CPT | Mod: HCNC,CPTII,S$GLB, | Performed by: FAMILY MEDICINE

## 2024-12-30 PROCEDURE — 3288F FALL RISK ASSESSMENT DOCD: CPT | Mod: HCNC,CPTII,S$GLB, | Performed by: FAMILY MEDICINE

## 2024-12-30 PROCEDURE — 99213 OFFICE O/P EST LOW 20 MIN: CPT | Mod: HCNC,S$GLB,, | Performed by: FAMILY MEDICINE

## 2024-12-30 PROCEDURE — 82043 UR ALBUMIN QUANTITATIVE: CPT | Mod: HCNC | Performed by: FAMILY MEDICINE

## 2024-12-30 PROCEDURE — 3078F DIAST BP <80 MM HG: CPT | Mod: HCNC,CPTII,S$GLB, | Performed by: FAMILY MEDICINE

## 2024-12-30 PROCEDURE — 3074F SYST BP LT 130 MM HG: CPT | Mod: HCNC,CPTII,S$GLB, | Performed by: FAMILY MEDICINE

## 2025-01-08 NOTE — PROGRESS NOTES
Clinic Note  1/8/2025      Subjective:       Patient ID:  Gina is a 80 y.o. female being seen for:      History of Present Illness    CHIEF COMPLAINT:  Gina presents today for persistent joint pain and swelling.    JOINT PAIN:  She reports joint pain during activities of daily living, particularly while washing dishes. Pain medication has been ineffective and caused significant discomfort. Topical treatments have also been ineffective in managing symptoms. She denies using protective gloves during activities.    MEDICAL HISTORY:  She has a history of diabetes. Her blood sugar has decreased from 194 to 131.    FAMILY HISTORY:  She has a family history of diabetes.      ROS:  General: -fever, -chills, -fatigue, -weight gain, -weight loss  Eyes: -vision changes, -redness, -discharge  ENT: -ear pain, -nasal congestion, -sore throat  Cardiovascular: -chest pain, -palpitations, -lower extremity edema  Respiratory: -cough, -shortness of breath  Gastrointestinal: -abdominal pain, -nausea, -vomiting, -diarrhea, -constipation, -blood in stool  Genitourinary: -dysuria, -hematuria, -frequency  Musculoskeletal: +joint pain, -muscle pain, +joint swelling  Skin: -rash, -lesion  Neurological: -headache, -dizziness, -numbness, -tingling  Psychiatric: -anxiety, -depression, -sleep difficulty           Medication List with Changes/Refills   Current Medications    ALENDRONATE (FOSAMAX) 70 MG TABLET    Take 1 tablet (70 mg total) by mouth every 7 days. Clinton fuchs    ATORVASTATIN (LIPITOR) 40 MG TABLET    UONG 1 [facility] (JOHN MO) - TAKE ONE TABLET BY MOUTH EVERY DAY    DICLOFENAC SODIUM (VOLTAREN) 1 % GEL    APPLY TWO GRAM topically FOUR TIMES DAILY    ERGOCALCIFEROL (ERGOCALCIFEROL) 50,000 UNIT CAP    Take 1 capsule (50,000 Units total) by mouth every 7 days. Heri fuchs    LISINOPRIL-HYDROCHLOROTHIAZIDE (PRINZIDE,ZESTORETIC) 20-25 MG TAB    Take 1 tablet by mouth once daily.    METFORMIN (GLUCOPHAGE-XR) 500 MG ER 24HR TABLET    Take 2  tablets (1,000 mg total) by mouth 2 (two) times a day.    PREGABALIN (LYRICA) 25 MG CAPSULE    Take 1 capsule (25 mg total) by mouth 2 (two) times daily. [place] сергей    TRIAMCINOLONE ACETONIDE 0.1% (KENALOG) 0.1 % CREAM    APPLY topically TWICE DAILY       Patient Active Problem List   Diagnosis    Cataract, left    Type 2 diabetes mellitus with chronic kidney disease, without long-term current use of insulin, unspecified CKD stage           Objective:      /60   Pulse 68   Temp 97 °F (36.1 °C)   Resp 16   Wt 53.9 kg (118 lb 15 oz)   SpO2 100%   BMI 23.23 kg/m²       Physical Exam    General: No acute distress. Well-developed. Well-nourished.  Eyes: EOMI. Sclerae anicteric.  HENT: Normocephalic. Atraumatic. Nares patent. Moist oral mucosa.  Cardiovascular: Regular rate. Regular rhythm. No murmurs. No rubs. No gallops. Normal S1, S2.  Respiratory: Normal respiratory effort. Clear to auscultation bilaterally. No rales. No rhonchi. No wheezing.  Musculoskeletal: No  obvious deformity. Joint is swollen.  Extremities: No lower extremity edema.  Neurological: Alert & oriented x3. No slurred speech. Normal gait.  Psychiatric: Normal mood. Normal affect. Good insight. Good judgment.  Skin: Warm. Dry. No rash. Skin has turned gray.           Assessment and Plan:       - Assessed patient's joint pain and swelling, likely due to arthritis  - Reviewed blood sugar, noting improvement from 194 to 131  - Evaluated previous imaging results showing slight joint inflammation    RHEUMATOID ARTHRITIS AND JOINT PAIN:  - Explained benefits of wearing arthritis gloves for joint pain relief.  - Hue to wear arthritis gloves at night for at least 3 days.  - Hue to consider wearing arthritis gloves during the day.  - Continued topical ointment for joint pain.    TYPE 2 DIABETES:  - last Ha1c 6.2  - Discussed the impact of rice consumption on diabetes risk in [place] patients.  - Recommend limiting rice consumption to manage blood  sugar.  - Urine test ordered to check for kidney effects of diabetes.    DIETARY COUNSELING:  - Recommend limiting rice consumption to manage blood sugar.    FOLLOW-UP:  - Follow up in 4 months (April).  - Provide urine sample at  before leaving.         Follow Up:   Follow up in about 4 months (around 4/30/2025).    Other Orders Placed This Visit:  Orders Placed This Encounter    Microalbumin/Creatinine Ratio, Urine         Spencer Bennett MD        This note is dictated on [employer] word recognition program and This note was generated with the assistance of ambient listening technology. Verbal consent was obtained by the patient and accompanying visitor(s) for the recording of patient appointment to facilitate this note. I attest to having reviewed and edited the generated note for accuracy, though some syntax or spelling errors may persist. Please contact the author of this note for any clarification.  .  There are word recognition mistakes that are occasionally missed on review.

## 2025-01-09 RX ORDER — ATORVASTATIN CALCIUM 40 MG/1
TABLET, FILM COATED ORAL
Qty: 90 TABLET | Refills: 3 | Status: SHIPPED | OUTPATIENT
Start: 2025-01-09

## 2025-01-09 RX ORDER — METFORMIN HYDROCHLORIDE 500 MG/1
TABLET ORAL
Qty: 180 TABLET | Refills: 3 | OUTPATIENT
Start: 2025-01-09

## 2025-01-09 NOTE — TELEPHONE ENCOUNTER
Refill Decision Note   Gina Bennett  is requesting a refill authorization.  Brief Assessment and Rationale for Refill:  Quick Discontinue  Approve     Medication Therapy Plan:  prescription was discontinued on 4/26/2024 by Spencer Bennett MD CHANGED TO METFORMIN  TK 2 PO BID      Comments:     Note composed:11:29 AM 01/09/2025

## 2025-01-09 NOTE — TELEPHONE ENCOUNTER
Care Due:                  Date            Visit Type   Department     Provider  --------------------------------------------------------------------------------                                EP -                              PRIMARY      [facility] PRIMARY  Last Visit: 12-      CARE ([facility])   CARE           GRETCHEN VELA                              EP -                              PRIMARY      [facility] PRIMARY  Next Visit: 05-      CARE ([facility])   [facility]           GRETCHEN VELA                                                            Last  Test          Frequency    Reason                     Performed    Due Date  --------------------------------------------------------------------------------    CBC.........  12 months..  diclofenac...............  03- 03-    Mg Level....  12 months..  alendronate..............  04- 04-    Phosphate...  12 months..  alendronate..............  Not Found    Overdue    [employer] Embedded Care Due Messages. Reference number: [id number].   1/09/2025 8:02:46 AM CST

## 2025-02-11 ENCOUNTER — TELEPHONE (OUTPATIENT)
Dept: OPHTHALMOLOGY | Facility: CLINIC | Age: 80
End: 2025-02-11
Payer: MEDICARE

## 2025-02-13 ENCOUNTER — OFFICE VISIT (OUTPATIENT)
Dept: OPTOMETRY | Facility: CLINIC | Age: 80
End: 2025-02-13
Payer: COMMERCIAL

## 2025-02-13 DIAGNOSIS — H26.492 LEFT POSTERIOR CAPSULAR OPACIFICATION: ICD-10-CM

## 2025-02-13 DIAGNOSIS — Z96.1 PSEUDOPHAKIA: ICD-10-CM

## 2025-02-13 DIAGNOSIS — Z01.00 EXAMINATION OF EYES AND VISION: Primary | ICD-10-CM

## 2025-02-13 DIAGNOSIS — H25.811 COMBINED FORMS OF AGE-RELATED CATARACT OF RIGHT EYE: ICD-10-CM

## 2025-02-13 DIAGNOSIS — H52.7 REFRACTIVE ERROR: ICD-10-CM

## 2025-02-13 PROCEDURE — 92015 DETERMINE REFRACTIVE STATE: CPT | Mod: S$GLB,,, | Performed by: OPTOMETRIST

## 2025-02-13 PROCEDURE — 92004 COMPRE OPH EXAM NEW PT 1/>: CPT | Mod: S$GLB,,, | Performed by: OPTOMETRIST

## 2025-02-13 PROCEDURE — 99999 PR PBB SHADOW E&M-EST. PATIENT-LVL III: CPT | Mod: PBBFAC,,, | Performed by: OPTOMETRIST

## 2025-02-13 NOTE — PROGRESS NOTES
HPI    Pt states eyes have been very dry lately, not using any gtts  Some blurry vision    (-) headaches  (-) gtts  (+) cataract sx, just one eye unsure which one     Hemoglobin A1C       Date                     Value               Ref Range             Status                11/04/2024               6.2 (H)             4.0 - 5.6 %           Final                 07/29/2024               6.4 (H)             4.0 - 5.6 %           Final                 06/26/2024               6.5 (H)             4.0 - 5.6 %           Final              DOROTHY ~ 1 year ago in [place]   Cataract surgery in 4-5 years ago   Last edited by Casper Shah, OD on 2/13/2025  8:23 AM.            Assessment /Plan     For exam results, see Encounter Report.    Examination of eyes and vision    Combined forms of age-related cataract of right eye    Pseudophakia    Left posterior capsular opacification    Refractive error  -     Ambulatory referral/consult to Optometry      1. Examination of eyes and vision (Primary)    2. Combined forms of age-related cataract of right eye  Significant cataract OD   Discussed options, pt hesitant to do surgery   Prefers to try specs first, report back for cataract eval prn    3. Pseudophakia  4. Left posterior capsular opacification  S/p cataract extraction OS  Mild posterior capsular opacification  Not VS  Observe     5. Refractive error  Dispensed updated spectacle Rx. Discussed various spectacle lens options - recommend lined bifocal. Discussed adaptation period to new specs.

## 2025-02-24 DIAGNOSIS — Z00.00 ENCOUNTER FOR MEDICARE ANNUAL WELLNESS EXAM: ICD-10-CM

## 2025-04-03 DIAGNOSIS — L29.9 ITCHY SKIN: ICD-10-CM

## 2025-04-03 RX ORDER — TRIAMCINOLONE ACETONIDE 1 MG/G
CREAM TOPICAL 2 TIMES DAILY
Qty: 80 G | Refills: 1 | Status: SHIPPED | OUTPATIENT
Start: 2025-04-03

## 2025-04-03 NOTE — TELEPHONE ENCOUNTER
Care Due:                  Date            Visit Type   Department     Provider  --------------------------------------------------------------------------------                                EP -                              PRIMARY      [facility] PRIMARY  Last Visit: 12-      CARE ([facility])   CARE           Spencer Bennett                              EP -                              PRIMARY      [facility] PRIMARY  Next Visit: 05-      CARE ([facility])   ARMANI Vaz                                                            Last  Test          Frequency    Reason                     Performed    Due Date  --------------------------------------------------------------------------------    CBC.........  12 months..  diclofenac...............  03- 03-    HBA1C.......  6 months...  metFORMIN................  11- 05-    Mg Level....  12 months..  alendronate..............  04- 04-    Phosphate...  12 months..  alendronate..............  Not Found    Overdue    Vitamin D...  12 months..  ergocalciferol...........  04- 04-    [facility] Embedded Care Due Messages. Reference number: [id number].   4/03/2025 8:03:25 AM CDT

## 2025-04-03 NOTE — TELEPHONE ENCOUNTER
Provider Staff:  Action required for this patient    Requires labs      Please see care gap opportunities below in Care Due Message.    Thanks!  [facility]     Appointments      Date Provider   Last Visit   12/30/2024 Spencer Bennett MD   Next Visit   5/2/2025 Spencer Bennett MD     Refill Decision Note   Gina Sabrina  is requesting a refill authorization.  Brief Assessment and Rationale for Refill:  Approve     Medication Therapy Plan:         Comments:     Note composed:9:20 AM 04/03/2025

## 2025-05-02 ENCOUNTER — LAB VISIT (OUTPATIENT)
Dept: PRIMARY CARE CLINIC | Facility: CLINIC | Age: 80
End: 2025-05-02
Payer: MEDICARE

## 2025-05-02 ENCOUNTER — OFFICE VISIT (OUTPATIENT)
Dept: PRIMARY CARE CLINIC | Facility: CLINIC | Age: 80
End: 2025-05-02
Payer: MEDICARE

## 2025-05-02 VITALS
WEIGHT: 115.19 LBS | SYSTOLIC BLOOD PRESSURE: 100 MMHG | OXYGEN SATURATION: 99 % | HEART RATE: 72 BPM | BODY MASS INDEX: 22.5 KG/M2 | DIASTOLIC BLOOD PRESSURE: 62 MMHG

## 2025-05-02 DIAGNOSIS — M54.50 LUMBAR BACK PAIN: ICD-10-CM

## 2025-05-02 DIAGNOSIS — E11.22 TYPE 2 DIABETES MELLITUS WITH CHRONIC KIDNEY DISEASE, WITHOUT LONG-TERM CURRENT USE OF INSULIN, UNSPECIFIED CKD STAGE: ICD-10-CM

## 2025-05-02 DIAGNOSIS — M25.559 HIP PAIN, UNSPECIFIED LATERALITY: ICD-10-CM

## 2025-05-02 DIAGNOSIS — R29.6 FALLING: ICD-10-CM

## 2025-05-02 DIAGNOSIS — K21.9 GASTROESOPHAGEAL REFLUX DISEASE, UNSPECIFIED WHETHER ESOPHAGITIS PRESENT: Primary | ICD-10-CM

## 2025-05-02 LAB
EAG (OHS): 126 MG/DL (ref 68–131)
HBA1C MFR BLD: 6 % (ref 4–5.6)

## 2025-05-02 PROCEDURE — 99214 OFFICE O/P EST MOD 30 MIN: CPT | Mod: HCNC,S$GLB,, | Performed by: FAMILY MEDICINE

## 2025-05-02 PROCEDURE — 83036 HEMOGLOBIN GLYCOSYLATED A1C: CPT | Mod: HCNC

## 2025-05-02 PROCEDURE — 99999 PR PBB SHADOW E&M-EST. PATIENT-LVL III: CPT | Mod: PBBFAC,HCNC,, | Performed by: FAMILY MEDICINE

## 2025-05-02 PROCEDURE — 1159F MED LIST DOCD IN RCRD: CPT | Mod: CPTII,HCNC,S$GLB, | Performed by: FAMILY MEDICINE

## 2025-05-02 PROCEDURE — 3074F SYST BP LT 130 MM HG: CPT | Mod: CPTII,HCNC,S$GLB, | Performed by: FAMILY MEDICINE

## 2025-05-02 PROCEDURE — 3078F DIAST BP <80 MM HG: CPT | Mod: CPTII,HCNC,S$GLB, | Performed by: FAMILY MEDICINE

## 2025-05-02 RX ORDER — PREGABALIN 25 MG/1
25 CAPSULE ORAL 2 TIMES DAILY
Qty: 60 CAPSULE | Refills: 6 | Status: SHIPPED | OUTPATIENT
Start: 2025-05-02 | End: 2025-10-31

## 2025-05-02 RX ORDER — PANTOPRAZOLE SODIUM 40 MG/1
40 TABLET, DELAYED RELEASE ORAL DAILY
Qty: 90 TABLET | Refills: 3 | Status: SHIPPED | OUTPATIENT
Start: 2025-05-02

## 2025-05-02 NOTE — PROGRESS NOTES
Clinic Note  5/2/2025      Subjective:       Patient ID:  Gina is a 80 y.o. female being seen for:      History of Present Illness    CHIEF COMPLAINT:  Gina presents today with bilateral leg pain and numbness    MUSCULOSKELETAL:  She reports bilateral leg pain and numbness, particularly at night while lying down. The symptoms affect her knees, legs, and feet, causing frequent position changes. She also experiences hip pain affecting her mobility, requiring her to sit before standing. These symptoms interfere with sleep. Naproxen has been ineffective for pain management.    MEDICATIONS:  She takes diabetes medication twice daily, blood pressure medication, lipid medication, and weekly osteoporosis medication.    GI CONCERNS:  She reports stomach discomfort from medications, particularly at night. She recently took pain medication despite previous instructions to discontinue use.    RESPIRATORY:  She reports a cough for the past two weeks.    WEIGHT:  She has lost 3 lbs since December when she weighed 118 lbs.      ROS:  General: -fever, -chills, -fatigue, -weight gain, +weight loss  Eyes: -vision changes, -redness, -discharge  ENT: -ear pain, -nasal congestion, -sore throat  Cardiovascular: -chest pain, -palpitations, -lower extremity edema  Respiratory: +cough, -shortness of breath  Gastrointestinal: +abdominal pain, -nausea, -vomiting, -diarrhea, -constipation, -blood in stool  Genitourinary: -dysuria, -hematuria, -frequency  Musculoskeletal: -joint pain, -muscle pain, +limb pain, +back pain, +bone pain  Skin: -rash, -lesion  Neurological: -headache, -dizziness, +numbness, -tingling, +decreased sensation in extremities  Psychiatric: -anxiety, -depression, +sleep difficulty           Medication List with Changes/Refills   New Medications    PANTOPRAZOLE (PROTONIX) 40 MG TABLET    Take 1 tablet (40 mg total) by mouth once daily. Jose dejesus   Current Medications    ALENDRONATE (FOSAMAX) 70 MG TABLET    Take 1 tablet (70  mg total) by mouth every 7 days. Clinton fuchs    ATORVASTATIN (LIPITOR) 40 MG TABLET    TAKE ONE TABLET EVERY DAY (bianca mo)    DICLOFENAC SODIUM (VOLTAREN) 1 % GEL    APPLY TWO GRAM topically FOUR TIMES DAILY    ERGOCALCIFEROL (ERGOCALCIFEROL) 50,000 UNIT CAP    Take 1 capsule (50,000 Units total) by mouth every 7 days. Heri fuchs    LISINOPRIL-HYDROCHLOROTHIAZIDE (PRINZIDE,ZESTORETIC) 20-25 MG TAB    Take 1 tablet by mouth once daily.    METFORMIN (GLUCOPHAGE-XR) 500 MG ER 24HR TABLET    Take 2 tablets (1,000 mg total) by mouth 2 (two) times a day.    TRIAMCINOLONE ACETONIDE 0.1% (KENALOG) 0.1 % CREAM    APPLY topically TWICE DAILY   Changed and/or Refilled Medications    Modified Medication Previous Medication    PREGABALIN (LYRICA) 25 MG CAPSULE pregabalin (LYRICA) 25 MG capsule       Take 1 capsule (25 mg total) by mouth 2 (two) times daily. Thuoc сергей    Take 1 capsule (25 mg total) by mouth 2 (two) times daily. Thuoc сергей       Problem List[1]        Objective:      /62 (Patient Position: Lying)   Pulse 72   Wt 52.3 kg (115 lb 3.1 oz)   SpO2 99%   BMI 22.50 kg/m²       Physical Exam    General: No acute distress. Well-developed. Well-nourished.  Eyes: EOMI. Sclerae anicteric.  HENT: Normocephalic. Atraumatic. Nares patent. Moist oral mucosa.  Cardiovascular: Regular rate. Regular rhythm. No murmurs. No rubs. No gallops. Normal S1, S2.  Respiratory: Normal respiratory effort. Clear to auscultation bilaterally. No rales. No rhonchi. No wheezing.  Musculoskeletal: No  obvious deformity.  Extremities: No lower extremity edema.  Neurological: Alert & oriented x3. No slurred speech. Normal gait. Sensation intact for medial aspect of the foot (Bilateral). Sensation intact for dorsal aspect of the foot (Bilateral). Sensation intact for lateral aspect of the foot (Bilateral).  Psychiatric: Normal mood. Normal affect. Good insight. Good judgment.  Skin: Warm. Dry. No rash. Thin skin with visible blood vessels.            Assessment and Plan:     Assessment & Plan    - Considered neuropathy as potential cause of bilateral leg pain and numbness, likely due to diabetes or spinal/pelvic issues.  - Evaluated for possible arthritis, noting previous negative tests for autoimmune conditions like rheumatoid arthritis.  - Assessed for stomach issues, suspecting possible H. pylori given symptoms and weight loss.  - Reviewed recent weight loss, noting 3-pound decrease over 6 months.    TYPE 2 DIABETES MELLITUS:  - Gina currently takes 2 oral hypoglycemic medications daily.  - Ordered blood test for diabetes today.    DIABETIC NEUROPATHY:  - Gina experiences numbness and pain in bilateral legs, especially at night, likely due to diabetic neuropathy.  - Bilateral feet exam was normal despite reported neuropathy.  - Prescribed Lyrica 25 mg, 1 tablet in the morning and 1 in the afternoon for neuropathic pain.  - Educated patient about how diabetes can affect nerves causing these symptoms.    HYPERTENSION:  - Gina takes 1 antihypertensive medication daily.    HYPERLIPIDEMIA:  - Gina takes 1 lipid-lowering medication daily.    OSTEOPOROSIS:  - Gina normally takes osteoporosis medication once weekly.  - Temporarily discontinued this medication to assess if it is causing gastric pain.  - Will consider switching to injectable medication for osteoporosis, administered twice yearly.    HIP PAIN:  - Hue reports hip pain, especially when sitting and standing up.  - Considering referral to pain specialist for joint injections.    FUNCTIONAL DYSPEPSIA AND H. PYLORI:  - Gina experiences gastric pain, particularly at night.  - Prescribed gastric pain medication to be taken only when necessary.  - Discussed H. pylori bacteria as potential cause of pain, bloating, and weight loss, noting its contagious nature and potential link to gastric carcinoma if untreated.  - Ordered stool test for H. pylori and advised patient to avoid taking PPI at least 2 weeks prior to  testing  - Recommend eating 2-3 hours before bedtime.    ACUTE COUGH:  - Gina reports coughing for 2 weeks, possibly due to upper respiratory infection.  - Explained that prolonged coughing for 2-3 weeks is common in elderly patients with respiratory infections.    ABNORMAL WEIGHT LOSS:  - Gina has lost 3 lbs since December, now weighing 115 lbs.  - Gina is aware of and monitoring this weight loss.  - H. pylori (as noted above) is being investigated as a potential cause.    AGE-RELATED SKIN CHANGES:  - Ambreens skin is thin with more visible blood vessels, especially on feet.  - Explained these are normal age-related changes common in geriatric patients.    FOLLOW-UP:  - Follow up in 3-4 months, at the beginning of August.         Follow Up:   Follow up in about 3 months (around 8/2/2025) for follow-up.    Other Orders Placed This Visit:  Orders Placed This Encounter    H. pylori antigen, stool    Hemoglobin A1C    pregabalin (LYRICA) 25 MG capsule    pantoprazole (PROTONIX) 40 MG tablet         Spencer Bennett MD        This note is dictated on [employer] word recognition program and This note was generated with the assistance of ambient listening technology. Verbal consent was obtained by the patient and accompanying visitor(s) for the recording of patient appointment to facilitate this note. I attest to having reviewed and edited the generated note for accuracy, though some syntax or spelling errors may persist. Please contact the author of this note for any clarification.  .  There are word recognition mistakes that are occasionally missed on review.         [1]   Patient Active Problem List  Diagnosis    Cataract, left    Type 2 diabetes mellitus with chronic kidney disease, without long-term current use of insulin, unspecified CKD stage

## 2025-05-04 ENCOUNTER — RESULTS FOLLOW-UP (OUTPATIENT)
Dept: PRIMARY CARE CLINIC | Facility: CLINIC | Age: 80
End: 2025-05-04

## 2025-05-05 ENCOUNTER — LAB VISIT (OUTPATIENT)
Dept: PRIMARY CARE CLINIC | Facility: CLINIC | Age: 80
End: 2025-05-05
Payer: MEDICARE

## 2025-05-05 DIAGNOSIS — K21.9 GASTROESOPHAGEAL REFLUX DISEASE, UNSPECIFIED WHETHER ESOPHAGITIS PRESENT: ICD-10-CM

## 2025-05-05 PROCEDURE — 87338 HPYLORI STOOL AG IA: CPT | Mod: HCNC

## 2025-05-06 LAB
H. PYLORI SURFACE ANTIGEN, INTERPRETATION (OHS): POSITIVE
HELICOBACTER PYLORI SURFACE ANTIGEN (OHS): 3.72

## 2025-05-06 RX ORDER — METRONIDAZOLE 250 MG/1
250 TABLET ORAL 4 TIMES DAILY
Qty: 40 TABLET | Refills: 0 | Status: SHIPPED | OUTPATIENT
Start: 2025-05-06 | End: 2025-05-16

## 2025-05-06 RX ORDER — TETRACYCLINE HYDROCHLORIDE 500 MG/1
500 CAPSULE ORAL 4 TIMES DAILY
Qty: 40 CAPSULE | Refills: 0 | Status: SHIPPED | OUTPATIENT
Start: 2025-05-06 | End: 2025-05-16

## 2025-05-08 ENCOUNTER — TELEPHONE (OUTPATIENT)
Dept: PRIMARY CARE CLINIC | Facility: CLINIC | Age: 80
End: 2025-05-08
Payer: MEDICARE

## 2025-05-08 NOTE — TELEPHONE ENCOUNTER
----- Message from Spencer Bennett MD sent at 5/6/2025  5:20 PM CDT -----  Please call son and let him know that patient tested positive for H pylori which is a bacteria that lives in the stomach and can be the cause of her stomach issues.  I have sent her a 10 day   antibiotic treatment to her pharmacy.  The medication treatment is hard to tolerate and can cause a lot of nausea and upset stomach.  She should also be taking her pantoprazole that I prescribed to   her during her visit.  However, she will need to power through and treat the infection.  When I see her again in several months, we will recheck to see if the infection is still there.  ----- Message -----  From: Lab, Background User  Sent: 5/6/2025   1:35 PM CDT  To: Spencer Bennett MD

## 2025-06-03 DIAGNOSIS — E11.9 TYPE 2 DIABETES MELLITUS WITHOUT COMPLICATION, WITHOUT LONG-TERM CURRENT USE OF INSULIN: ICD-10-CM

## 2025-06-04 RX ORDER — METFORMIN HYDROCHLORIDE 500 MG/1
1000 TABLET, EXTENDED RELEASE ORAL 2 TIMES DAILY
Qty: 360 TABLET | Refills: 3 | Status: SHIPPED | OUTPATIENT
Start: 2025-06-04

## 2025-07-09 ENCOUNTER — LAB VISIT (OUTPATIENT)
Dept: PRIMARY CARE CLINIC | Facility: CLINIC | Age: 80
End: 2025-07-09
Payer: MEDICARE

## 2025-07-09 ENCOUNTER — OFFICE VISIT (OUTPATIENT)
Dept: PRIMARY CARE CLINIC | Facility: CLINIC | Age: 80
End: 2025-07-09
Payer: MEDICARE

## 2025-07-09 VITALS
OXYGEN SATURATION: 100 % | HEIGHT: 60 IN | DIASTOLIC BLOOD PRESSURE: 57 MMHG | SYSTOLIC BLOOD PRESSURE: 109 MMHG | WEIGHT: 114.5 LBS | HEART RATE: 82 BPM | BODY MASS INDEX: 22.48 KG/M2

## 2025-07-09 DIAGNOSIS — R00.2 PALPITATIONS: ICD-10-CM

## 2025-07-09 DIAGNOSIS — Z86.19 HISTORY OF HELICOBACTER PYLORI INFECTION: ICD-10-CM

## 2025-07-09 DIAGNOSIS — R53.83 FATIGUE, UNSPECIFIED TYPE: ICD-10-CM

## 2025-07-09 DIAGNOSIS — R00.2 PALPITATIONS: Primary | ICD-10-CM

## 2025-07-09 LAB
ABSOLUTE EOSINOPHIL (OHS): 0.49 K/UL
ABSOLUTE MONOCYTE (OHS): 0.55 K/UL (ref 0.3–1)
ABSOLUTE NEUTROPHIL COUNT (OHS): 3.37 K/UL (ref 1.8–7.7)
ALBUMIN SERPL BCP-MCNC: 4.7 G/DL (ref 3.5–5.2)
ALP SERPL-CCNC: 59 UNIT/L (ref 40–150)
ALT SERPL W/O P-5'-P-CCNC: 21 UNIT/L (ref 10–44)
ANION GAP (OHS): 10 MMOL/L (ref 8–16)
AST SERPL-CCNC: 27 UNIT/L (ref 11–45)
BASOPHILS # BLD AUTO: 0.06 K/UL
BASOPHILS NFR BLD AUTO: 0.8 %
BILIRUB SERPL-MCNC: 0.7 MG/DL (ref 0.1–1)
BUN SERPL-MCNC: 11 MG/DL (ref 8–23)
CALCIUM SERPL-MCNC: 9.3 MG/DL (ref 8.7–10.5)
CHLORIDE SERPL-SCNC: 96 MMOL/L (ref 95–110)
CO2 SERPL-SCNC: 25 MMOL/L (ref 23–29)
CREAT SERPL-MCNC: 0.8 MG/DL (ref 0.5–1.4)
ERYTHROCYTE [DISTWIDTH] IN BLOOD BY AUTOMATED COUNT: 12.6 % (ref 11.5–14.5)
GFR SERPLBLD CREATININE-BSD FMLA CKD-EPI: >60 ML/MIN/1.73/M2
GLUCOSE SERPL-MCNC: 80 MG/DL (ref 70–110)
HCT VFR BLD AUTO: 35 % (ref 37–48.5)
HGB BLD-MCNC: 11.5 GM/DL (ref 12–16)
IMM GRANULOCYTES # BLD AUTO: 0.01 K/UL (ref 0–0.04)
IMM GRANULOCYTES NFR BLD AUTO: 0.1 % (ref 0–0.5)
LYMPHOCYTES # BLD AUTO: 2.76 K/UL (ref 1–4.8)
MCH RBC QN AUTO: 31.3 PG (ref 27–31)
MCHC RBC AUTO-ENTMCNC: 32.9 G/DL (ref 32–36)
MCV RBC AUTO: 95 FL (ref 82–98)
NUCLEATED RBC (/100WBC) (OHS): 0 /100 WBC
OHS QRS DURATION: 82 MS
OHS QTC CALCULATION: 438 MS
PLATELET # BLD AUTO: 215 K/UL (ref 150–450)
PMV BLD AUTO: 9.8 FL (ref 9.2–12.9)
POTASSIUM SERPL-SCNC: 4.4 MMOL/L (ref 3.5–5.1)
PROT SERPL-MCNC: 7.5 GM/DL (ref 6–8.4)
RBC # BLD AUTO: 3.67 M/UL (ref 4–5.4)
RELATIVE EOSINOPHIL (OHS): 6.8 %
RELATIVE LYMPHOCYTE (OHS): 38.1 % (ref 18–48)
RELATIVE MONOCYTE (OHS): 7.6 % (ref 4–15)
RELATIVE NEUTROPHIL (OHS): 46.6 % (ref 38–73)
SODIUM SERPL-SCNC: 131 MMOL/L (ref 136–145)
TSH SERPL-ACNC: 1.31 UIU/ML (ref 0.4–4)
WBC # BLD AUTO: 7.24 K/UL (ref 3.9–12.7)

## 2025-07-09 PROCEDURE — 80053 COMPREHEN METABOLIC PANEL: CPT | Mod: HCNC

## 2025-07-09 PROCEDURE — 85025 COMPLETE CBC W/AUTO DIFF WBC: CPT | Mod: HCNC

## 2025-07-09 PROCEDURE — 84443 ASSAY THYROID STIM HORMONE: CPT | Mod: HCNC

## 2025-07-09 PROCEDURE — 99999 PR PBB SHADOW E&M-EST. PATIENT-LVL III: CPT | Mod: PBBFAC,HCNC,, | Performed by: FAMILY MEDICINE

## 2025-07-09 PROCEDURE — 93010 ELECTROCARDIOGRAM REPORT: CPT | Mod: HCNC,S$GLB,, | Performed by: INTERNAL MEDICINE

## 2025-07-10 ENCOUNTER — RESULTS FOLLOW-UP (OUTPATIENT)
Dept: PRIMARY CARE CLINIC | Facility: CLINIC | Age: 80
End: 2025-07-10

## 2025-07-10 ENCOUNTER — LAB VISIT (OUTPATIENT)
Dept: PRIMARY CARE CLINIC | Facility: CLINIC | Age: 80
End: 2025-07-10
Payer: MEDICARE

## 2025-07-10 DIAGNOSIS — Z86.19 HISTORY OF HELICOBACTER PYLORI INFECTION: ICD-10-CM

## 2025-07-10 DIAGNOSIS — I10 ESSENTIAL HYPERTENSION: Primary | ICD-10-CM

## 2025-07-10 DIAGNOSIS — R53.83 FATIGUE, UNSPECIFIED TYPE: ICD-10-CM

## 2025-07-10 DIAGNOSIS — I49.3 PVC (PREMATURE VENTRICULAR CONTRACTION): ICD-10-CM

## 2025-07-10 PROCEDURE — 87338 HPYLORI STOOL AG IA: CPT | Mod: HCNC

## 2025-07-10 NOTE — PROGRESS NOTES
"Clinic Note  7/10/2025      Subjective:       Patient ID:  Gina is a 80 y.o. female being seen for:      History of Present Illness    CHIEF COMPLAINT:  Gina presents today for follow-up    CARDIOPULMONARY SYMPTOMS:  She reports bilateral chest palpiations, worse on one side, described as a "biting" sensation that worsens with heart rhythm changes. Symptoms began on Saturday accompanied by dizziness. She experiences shortness of breath with associated fatigue, requiring her to sit and focus on breathing. Fatigue occurs intermittently, particularly after episodes of chest tightness. She denies recent heart surgery.    EMOTIONAL HEALTH:  She reports significant emotional distress related to son recently moving out and studying out of state. She experiences feelings of loneliness and emotional vulnerability due to physical distance from family members. These feelings appear to be exacerbating her physical symptoms.    GASTROINTESTINAL:  She reports history of H pylori infection identified through stool testing in May, which was treated with antibiotics.    DIABETES:  She reports well-managed blood sugar on recent testing. She maintains dietary control with sugar-free milk alternatives.    LABS/TEST RESULTS:  Recent kidney function tests showed no abnormalities.      ROS:  General: -fever, -chills, +fatigue, -weight gain, -weight loss  Eyes: -vision changes, -redness, -discharge  ENT: -ear pain, -nasal congestion, -sore throat  Cardiovascular: +chest pain, -palpitations, -lower extremity edema, +chest tightness  Respiratory: -cough, +shortness of breath  Gastrointestinal: -abdominal pain, -nausea, -vomiting, -diarrhea, -constipation, -blood in stool  Genitourinary: -dysuria, -hematuria, -frequency  Musculoskeletal: -joint pain, -muscle pain  Skin: -rash, -lesion  Neurological: -headache, +dizziness, -numbness, -tingling  Psychiatric: -anxiety, -depression, -sleep difficulty           Medication List with Changes/Refills "   Current Medications    ALENDRONATE (FOSAMAX) 70 MG TABLET    Take 1 tablet (70 mg total) by mouth every 7 days. Clinton fuchs    ATORVASTATIN (LIPITOR) 40 MG TABLET    TAKE ONE TABLET EVERY DAY (bianca pisano)    DICLOFENAC SODIUM (VOLTAREN) 1 % GEL    APPLY TWO GRAM topically FOUR TIMES DAILY    ERGOCALCIFEROL (ERGOCALCIFEROL) 50,000 UNIT CAP    Take 1 capsule (50,000 Units total) by mouth every 7 days. Heri goveamario    LISINOPRIL-HYDROCHLOROTHIAZIDE (PRINZIDE,ZESTORETIC) 20-25 MG TAB    Take 1 tablet by mouth once daily.    METFORMIN (GLUCOPHAGE-XR) 500 MG ER 24HR TABLET    TAKE TWO TABLETS BY MOUTH TWICE DAILY FOR DIABETES    PANTOPRAZOLE (PROTONIX) 40 MG TABLET    Take 1 tablet (40 mg total) by mouth once daily. Jose dejesus    PREGABALIN (LYRICA) 25 MG CAPSULE    Take 1 capsule (25 mg total) by mouth 2 (two) times daily. Vanessa rushing    TRIAMCINOLONE ACETONIDE 0.1% (KENALOG) 0.1 % CREAM    APPLY topically TWICE DAILY       Problem List[1]        Objective:      BP (!) 109/57   Pulse 82   Ht 5' (1.524 m)   Wt 51.9 kg (114 lb 8.5 oz)   SpO2 100%   BMI 22.37 kg/m²       Physical Exam    Vitals: Heart rate: 82.  General: No acute distress. Well-developed. Well-nourished.  Eyes: EOMI. Sclerae anicteric.  HENT: Normocephalic. Atraumatic. Nares patent. Moist oral mucosa.  Cardiovascular: Regular rate. Regular rhythm. No murmurs. No rubs. No gallops. Normal S1, S2.  Respiratory: Normal respiratory effort. Clear to auscultation bilaterally. No rales. No rhonchi. No wheezing.  Musculoskeletal: No  obvious deformity.  Extremities: No lower extremity edema.  Neurological: Alert & oriented x3. No slurred speech. Normal gait.  Psychiatric: Normal mood. Normal affect. Good insight. Good judgment.  Skin: Warm. Dry. No rash.           Assessment and Plan:     Assessment & Plan    - Considered cardiac evaluation due to reported chest tightness, fatigue, and dizziness.  - Informed about the potential link between persistent bacterial  infection and fatigue.    PALPITATIONS  - Monitored the patient's chest pain, which presents as tightness and biting sensation, occurring on Saturday and Sunday.  - Pain shifts from one side to the other and is associated with fatigue and weakness when chest tightens.  - Ordered ECG, 2-day Holter monitor, and echocardiogram to assess for potential arrhythmias and evaluate heart function.  - Instructed the patient on wearing the Holter monitor for 2 days to track heart rhythm and to return it after completion.  - Referred the patient to a cardiologist for further evaluation.  - EKG today NSR with PACs    DIZZINESS:  - Monitored the patient's dizziness, which occurred on Saturday with spinning sensation twice and once on Sunday.  - Evaluated the patient's anxiety related to these episodes.  - Ordered comprehensive labs to check for underlying conditions including liver, kidney, and thyroid function.  - Cardiac workup (ECG, Holter monitor, echocardiogram) will also help assess if dizziness is related to cardiac issues.    SHORTNESS OF BREATH:  - Monitored the patient's shortness of breath and associated fatigue.  - Evaluated the relationship between the patient's emotional state and respiratory symptoms.  - Cardiac workup as mentioned above will help determine if these symptoms are cardiac in origin.    DEPRESSION:  - Monitored the patient's depressive symptoms, including sadness and missing family.  - Evaluated how the patient's emotional state is affecting overall mood, energy levels, and physical symptoms including breathing.    TYPE 2 DIABETES:  - Monitored the patient's glycemic control, noting recent labs show good glucose levels.  - Evaluated the patient's dietary habits, including consumption of sugar-free milk.      HX OF H PYLORI  - Monitored the patient's history of bacterial infection diagnosed in May via stool test.  - Assessed the possibility of persistent or recurrent infection contributing to  fatigue.  - Ordered stool test to re-check for bacterial infection, with instructions for home collection.    FOLLOW-UP:  - Scheduled follow-up visit after the cardiologist consultation.         Follow Up:   Follow up in about 2 months (around 9/9/2025) for follow-up.    Other Orders Placed This Visit:  Orders Placed This Encounter    H. pylori antigen, stool    CBC Auto Differential    Comprehensive Metabolic Panel    TSH    Ambulatory referral/consult to Cardiology    Holter monitor - 48 hour    EKG 12-lead         Spencer Bennett MD        This note is dictated on [employer] word recognition program and This note was generated with the assistance of ambient listening technology. Verbal consent was obtained by the patient and accompanying visitor(s) for the recording of patient appointment to facilitate this note. I attest to having reviewed and edited the generated note for accuracy, though some syntax or spelling errors may persist. Please contact the author of this note for any clarification.  .  There are word recognition mistakes that are occasionally missed on review.         [1]   Patient Active Problem List  Diagnosis    Cataract, left    Type 2 diabetes mellitus with chronic kidney disease, without long-term current use of insulin, unspecified CKD stage

## 2025-07-13 RX ORDER — LISINOPRIL 20 MG/1
20 TABLET ORAL DAILY
Qty: 90 TABLET | Refills: 3 | Status: SHIPPED | OUTPATIENT
Start: 2025-07-13 | End: 2026-07-13

## 2025-07-13 RX ORDER — AMLODIPINE BESYLATE 5 MG/1
5 TABLET ORAL DAILY
Qty: 90 TABLET | Refills: 2 | Status: SHIPPED | OUTPATIENT
Start: 2025-07-13

## 2025-07-14 LAB
H. PYLORI SURFACE ANTIGEN, INTERPRETATION (OHS): NEGATIVE
HELICOBACTER PYLORI SURFACE ANTIGEN (OHS): 0.09

## 2025-07-22 DIAGNOSIS — M81.0 OSTEOPOROSIS, UNSPECIFIED OSTEOPOROSIS TYPE, UNSPECIFIED PATHOLOGICAL FRACTURE PRESENCE: ICD-10-CM

## 2025-07-22 RX ORDER — ALENDRONATE SODIUM 70 MG/1
TABLET ORAL
Qty: 12 TABLET | Refills: 3 | Status: SHIPPED | OUTPATIENT
Start: 2025-07-22

## 2025-07-22 NOTE — TELEPHONE ENCOUNTER
No care due was identified.  [facility] Embedded Care Due Messages. Reference number: [id number].   7/22/2025 1:04:40 PM CDT

## 2025-08-04 ENCOUNTER — TELEPHONE (OUTPATIENT)
Dept: PRIMARY CARE CLINIC | Facility: CLINIC | Age: 80
End: 2025-08-04
Payer: MEDICARE

## 2025-08-04 NOTE — TELEPHONE ENCOUNTER
----- Message from Spencer Bennett MD sent at 8/2/2025  3:54 PM CDT -----  Please call pt or son and let them know that her heart monitor shows a lot of extra heartbeats which may be responsible for the chest sensation she is feeling. Please follow-up with cardiology in a   few weeks.   ----- Message -----  From: August Bennett MD  Sent: 7/15/2025   5:10 PM CDT  To: Spencer Bennett MD

## 2025-08-10 DIAGNOSIS — L29.9 ITCHY SKIN: ICD-10-CM

## 2025-08-10 RX ORDER — TRIAMCINOLONE ACETONIDE 1 MG/G
CREAM TOPICAL
Qty: 80 G | Refills: 1 | Status: SHIPPED | OUTPATIENT
Start: 2025-08-10

## 2025-08-19 ENCOUNTER — PATIENT MESSAGE (OUTPATIENT)
Dept: CARDIOLOGY | Facility: CLINIC | Age: 80
End: 2025-08-19
Payer: MEDICARE

## 2025-08-19 ENCOUNTER — TELEPHONE (OUTPATIENT)
Dept: CARDIOLOGY | Facility: CLINIC | Age: 80
End: 2025-08-19
Payer: MEDICARE

## 2025-08-21 ENCOUNTER — OFFICE VISIT (OUTPATIENT)
Dept: CARDIOLOGY | Facility: CLINIC | Age: 80
End: 2025-08-21
Payer: MEDICARE

## 2025-08-21 VITALS
DIASTOLIC BLOOD PRESSURE: 61 MMHG | WEIGHT: 112.31 LBS | HEIGHT: 60 IN | OXYGEN SATURATION: 97 % | HEART RATE: 69 BPM | BODY MASS INDEX: 22.05 KG/M2 | SYSTOLIC BLOOD PRESSURE: 129 MMHG

## 2025-08-21 DIAGNOSIS — R93.1 ABNORMAL ECHOCARDIOGRAM: ICD-10-CM

## 2025-08-21 DIAGNOSIS — R07.9 CHEST PAIN OF UNCERTAIN ETIOLOGY: ICD-10-CM

## 2025-08-21 DIAGNOSIS — I70.0 AORTIC ATHEROSCLEROSIS: ICD-10-CM

## 2025-08-21 DIAGNOSIS — I49.3 FREQUENT PVCS: ICD-10-CM

## 2025-08-21 DIAGNOSIS — I10 PRIMARY HYPERTENSION: ICD-10-CM

## 2025-08-21 DIAGNOSIS — R07.9 CHEST PAIN, UNSPECIFIED TYPE: Primary | ICD-10-CM

## 2025-08-21 DIAGNOSIS — E11.22 TYPE 2 DIABETES MELLITUS WITH CHRONIC KIDNEY DISEASE, WITHOUT LONG-TERM CURRENT USE OF INSULIN, UNSPECIFIED CKD STAGE: ICD-10-CM

## 2025-08-21 DIAGNOSIS — I49.3 PVC (PREMATURE VENTRICULAR CONTRACTION): ICD-10-CM

## 2025-08-21 DIAGNOSIS — R06.02 SOB (SHORTNESS OF BREATH): ICD-10-CM

## 2025-08-21 PROCEDURE — 1126F AMNT PAIN NOTED NONE PRSNT: CPT | Mod: CPTII,HCNC,S$GLB, | Performed by: INTERNAL MEDICINE

## 2025-08-21 PROCEDURE — 1159F MED LIST DOCD IN RCRD: CPT | Mod: CPTII,HCNC,S$GLB, | Performed by: INTERNAL MEDICINE

## 2025-08-21 PROCEDURE — 3074F SYST BP LT 130 MM HG: CPT | Mod: CPTII,HCNC,S$GLB, | Performed by: INTERNAL MEDICINE

## 2025-08-21 PROCEDURE — 1160F RVW MEDS BY RX/DR IN RCRD: CPT | Mod: CPTII,HCNC,S$GLB, | Performed by: INTERNAL MEDICINE

## 2025-08-21 PROCEDURE — 1101F PT FALLS ASSESS-DOCD LE1/YR: CPT | Mod: CPTII,HCNC,S$GLB, | Performed by: INTERNAL MEDICINE

## 2025-08-21 PROCEDURE — 3078F DIAST BP <80 MM HG: CPT | Mod: CPTII,HCNC,S$GLB, | Performed by: INTERNAL MEDICINE

## 2025-08-21 PROCEDURE — 3288F FALL RISK ASSESSMENT DOCD: CPT | Mod: CPTII,HCNC,S$GLB, | Performed by: INTERNAL MEDICINE

## 2025-08-21 PROCEDURE — 99204 OFFICE O/P NEW MOD 45 MIN: CPT | Mod: HCNC,S$GLB,, | Performed by: INTERNAL MEDICINE

## 2025-08-21 PROCEDURE — 99999 PR PBB SHADOW E&M-EST. PATIENT-LVL IV: CPT | Mod: PBBFAC,HCNC,, | Performed by: INTERNAL MEDICINE

## 2025-08-27 DIAGNOSIS — Z78.0 MENOPAUSE: ICD-10-CM

## 2025-09-04 ENCOUNTER — PATIENT MESSAGE (OUTPATIENT)
Dept: CARDIOLOGY | Facility: CLINIC | Age: 80
End: 2025-09-04

## 2025-09-04 ENCOUNTER — HOSPITAL ENCOUNTER (OUTPATIENT)
Dept: CARDIOLOGY | Facility: HOSPITAL | Age: 80
Discharge: HOME | End: 2025-09-04
Attending: INTERNAL MEDICINE
Payer: MEDICARE

## 2025-09-04 VITALS
DIASTOLIC BLOOD PRESSURE: 61 MMHG | HEART RATE: 60 BPM | WEIGHT: 112.44 LBS | SYSTOLIC BLOOD PRESSURE: 129 MMHG | HEIGHT: 59 IN | BODY MASS INDEX: 22.67 KG/M2

## 2025-09-04 VITALS
SYSTOLIC BLOOD PRESSURE: 134 MMHG | HEIGHT: 59 IN | DIASTOLIC BLOOD PRESSURE: 63 MMHG | BODY MASS INDEX: 22.58 KG/M2 | HEART RATE: 76 BPM | WEIGHT: 112 LBS

## 2025-09-04 DIAGNOSIS — I49.3 PVC (PREMATURE VENTRICULAR CONTRACTION): ICD-10-CM

## 2025-09-04 DIAGNOSIS — R07.9 CHEST PAIN, UNSPECIFIED TYPE: ICD-10-CM

## 2025-09-04 DIAGNOSIS — R93.1 ABNORMAL ECHOCARDIOGRAM: ICD-10-CM

## 2025-09-04 LAB
AORTIC SIZE INDEX (SOV): 1.8 CM/M2
AORTIC SIZE INDEX: 1.7 CM/M2
ASCENDING AORTA: 2.5 CM
AV AREA BY CONTINUOUS VTI: 1.3 CM2
AV INDEX (PROSTH): 0.52
AV LVOT MEAN GRADIENT: 1 MMHG
AV LVOT PEAK GRADIENT: 2 MMHG
AV MEAN GRADIENT: 5 MMHG
AV PEAK GRADIENT: 10 MMHG
AV VALVE AREA BY VELOCITY RATIO: 1.3 CM²
AV VALVE AREA: 1.3 CM2
AV VELOCITY RATIO: 0.5
BSA FOR ECHO PROCEDURE: 1.46 M2
CV ECHO LV RWT: 0.4 CM
CV PHARM DOSE: 0.4 MG
CV STRESS BASE HR: 66 BPM
DIASTOLIC BLOOD PRESSURE: 52 MMHG
DOP CALC AO PEAK VEL: 1.6 M/S
DOP CALC AO VTI: 39.6 CM
DOP CALC LVOT AREA: 2.5 CM2
DOP CALC LVOT DIAMETER: 1.8 CM
DOP CALC LVOT PEAK VEL: 0.8 M/S
DOP CALCLVOT PEAK VEL VTI: 20.6 CM
E WAVE DECELERATION TIME: 219 MS
E/A RATIO: 0.8
E/E' RATIO: 15 M/S
ECHO EF ESTIMATED: 78 %
ECHO LV POSTERIOR WALL: 0.7 CM (ref 0.6–1.1)
EJECTION FRACTION- HIGH: 65 %
END DIASTOLIC INDEX-HIGH: 153 ML/M2
END DIASTOLIC INDEX-LOW: 93 ML/M2
END SYSTOLIC INDEX-HIGH: 71 ML/M2
END SYSTOLIC INDEX-LOW: 31 ML/M2
FRACTIONAL SHORTENING: 45.7 % (ref 28–44)
GLOBAL LONGITUIDAL STRAIN: 20 %
INTERVENTRICULAR SEPTUM: 0.8 CM (ref 0.6–1.1)
IVC DIAMETER: 1.23 CM
IVRT: 120 MS
LA MAJOR: 4.4 CM
LA MINOR: 5.2 CM
LA WIDTH: 3.3 CM
LEFT ATRIUM SIZE: 3.4 CM
LEFT ATRIUM VOLUME INDEX MOD: 28 ML/M2
LEFT ATRIUM VOLUME INDEX: 32 ML/M2
LEFT ATRIUM VOLUME MOD: 40 ML
LEFT ATRIUM VOLUME: 45 CM3
LEFT INTERNAL DIMENSION IN SYSTOLE: 1.9 CM (ref 2.1–4)
LEFT VENTRICLE DIASTOLIC VOLUME INDEX: 34.72 ML/M2
LEFT VENTRICLE DIASTOLIC VOLUME: 50 ML
LEFT VENTRICLE MASS INDEX: 47.9 G/M2
LEFT VENTRICLE SYSTOLIC VOLUME INDEX: 7.6 ML/M2
LEFT VENTRICLE SYSTOLIC VOLUME: 11 ML
LEFT VENTRICULAR INTERNAL DIMENSION IN DIASTOLE: 3.5 CM (ref 3.5–6)
LEFT VENTRICULAR MASS: 68.9 G
LV LATERAL E/E' RATIO: 15.3
LV SEPTAL E/E' RATIO: 15.3
Lab: 1.7 CM/M
Lab: 1.7 CM/M
MV A" WAVE DURATION": 188.39 MS
MV PEAK A VEL: 1.34 M/S
MV PEAK E VEL: 1.07 M/S
NUC REST DIASTOLIC VOLUME INDEX: 51
NUC REST EJECTION FRACTION: 71
NUC REST SYSTOLIC VOLUME INDEX: 15
NUC STRESS DIASTOLIC VOLUME INDEX: 56
NUC STRESS EJECTION FRACTION: 73 %
NUC STRESS SYSTOLIC VOLUME INDEX: 15
OHS CV CPX 1 MINUTE RECOVERY HEART RATE: 83 BPM
OHS CV CPX 85 PERCENT MAX PREDICTED HEART RATE MALE: 120
OHS CV CPX MAX PREDICTED HEART RATE: 141
OHS CV CPX PATIENT HEIGHT IN: 59
OHS CV CPX PATIENT HEIGHT IN: 59.05
OHS CV CPX PATIENT IS FEMALE: 1
OHS CV CPX PATIENT IS MALE: 0
OHS CV CPX PEAK DIASTOLIC BLOOD PRESSURE: 72 MMHG
OHS CV CPX PEAK HEAR RATE: 64 BPM
OHS CV CPX PEAK RATE PRESSURE PRODUCT: 7296
OHS CV CPX PEAK SYSTOLIC BLOOD PRESSURE: 114 MMHG
OHS CV CPX PERCENT MAX PREDICTED HEART RATE ACHIEVED: 47
OHS CV CPX RATE PRESSURE PRODUCT PRESENTING: 8976
OHS CV INITIAL DOSE: 10.9 MCG/KG/MIN
OHS CV PEAK DOSE: 31.1 MCG/KG/MIN
OHS CV RV/LV RATIO: 0.83 CM
OHS LV EJECTION FRACTION SIMPSONS BIPLANE MOD: 62 %
PISA TR MAX VEL: 3 M/S
PULM VEIN A" WAVE DURATION": 188.39 MS
PULM VEIN S/D RATIO: 1.89
PULMONIC VEIN PEAK A VELOCITY: 0.3 M/S
PV PEAK D VEL: 0.28 M/S
PV PEAK S VEL: 0.53 M/S
RA MAJOR: 4.12 CM
RA PRESSURE ESTIMATED: 3 MMHG
RA WIDTH: 3.06 CM
RETIRED EF AND QEF - SEE NOTES: 53 %
RIGHT ATRIAL AREA: 10.2 CM2
RIGHT VENTRICLE DIASTOLIC BASEL DIMENSION: 2.9 CM
RV TB RVSP: 6 MMHG
RV TISSUE DOPPLER FREE WALL SYSTOLIC VELOCITY 1 (APICAL 4 CHAMBER VIEW): 10.26 CM/S
SINUS: 2.6 CM
STJ: 2.7 CM
SYSTOLIC BLOOD PRESSURE: 136 MMHG
TDI LATERAL: 0.07 M/S
TDI SEPTAL: 0.07 M/S
TDI: 0.07 M/S
TRICUSPID ANNULAR PLANE SYSTOLIC EXCURSION: 2.1 CM
TV PEAK GRADIENT: 35 MMHG
TV REST PULMONARY ARTERY PRESSURE: 39 MMHG
Z-SCORE OF LEFT VENTRICULAR DIMENSION IN END DIASTOLE: -2.24
Z-SCORE OF LEFT VENTRICULAR DIMENSION IN END SYSTOLE: -2.83

## 2025-09-04 PROCEDURE — A9502 TC99M TETROFOSMIN: HCPCS | Mod: HCNC | Performed by: INTERNAL MEDICINE

## 2025-09-04 PROCEDURE — 78452 HT MUSCLE IMAGE SPECT MULT: CPT | Mod: HCNC

## 2025-09-04 PROCEDURE — 93356 MYOCRD STRAIN IMG SPCKL TRCK: CPT | Mod: HCNC,,, | Performed by: INTERNAL MEDICINE

## 2025-09-04 PROCEDURE — 93306 TTE W/DOPPLER COMPLETE: CPT | Mod: 26,HCNC,, | Performed by: INTERNAL MEDICINE

## 2025-09-04 PROCEDURE — 93306 TTE W/DOPPLER COMPLETE: CPT | Mod: HCNC

## 2025-09-04 PROCEDURE — 63600175 PHARM REV CODE 636 W HCPCS: Mod: HCNC | Performed by: INTERNAL MEDICINE

## 2025-09-04 RX ORDER — REGADENOSON 0.08 MG/ML
0.4 INJECTION, SOLUTION INTRAVENOUS
Status: COMPLETED | OUTPATIENT
Start: 2025-09-04 | End: 2025-09-04

## 2025-09-04 RX ADMIN — TETROFOSMIN 31.1 MILLICURIE: 1.38 INJECTION, POWDER, LYOPHILIZED, FOR SOLUTION INTRAVENOUS at 08:09

## 2025-09-04 RX ADMIN — TETROFOSMIN 10.9 MILLICURIE: 1.38 INJECTION, POWDER, LYOPHILIZED, FOR SOLUTION INTRAVENOUS at 08:09

## 2025-09-04 RX ADMIN — REGADENOSON 0.4 MG: 0.08 INJECTION, SOLUTION INTRAVENOUS at 08:09

## (undated) DEVICE — SYR 3CC LUER LOC

## (undated) DEVICE — TIP I/A CURVED SINGLE USE

## (undated) DEVICE — CARTRIDGE LENS D

## (undated) DEVICE — NDL HYPO A BEVEL 30X1/2

## (undated) DEVICE — KNIFE SLIT PHACO 2.4MM

## (undated) DEVICE — SYR LUER LOCK 1CC

## (undated) DEVICE — SYR DISP LL 5CC

## (undated) DEVICE — PACK CUSTOM EYE KIT

## (undated) DEVICE — KNIFE OPT SFTY SD PRT 1.15MM20

## (undated) DEVICE — CYSTOTOME IRRIG 24G 13MM

## (undated) DEVICE — GLOVE SURG ULTRA TOUCH 7

## (undated) DEVICE — TIP KELMAN 30 DEGREE

## (undated) DEVICE — GLOVE SURG ULTRA TOUCH 7.5

## (undated) DEVICE — SHIELD EYE PLASTIC 3100G

## (undated) DEVICE — SLEEVE ULTRA INFUSION

## (undated) DEVICE — DRAPE OPHTHALMIC 48X62 FEN

## (undated) DEVICE — SOL WATER STRL IRR 1000ML

## (undated) DEVICE — SEE L#120831

## (undated) DEVICE — SYS LABEL CORRECT MED

## (undated) DEVICE — NDL HYPODERMIC BLUNT 18G 1.5IN